# Patient Record
Sex: FEMALE | Race: BLACK OR AFRICAN AMERICAN | NOT HISPANIC OR LATINO | Employment: PART TIME | URBAN - METROPOLITAN AREA
[De-identification: names, ages, dates, MRNs, and addresses within clinical notes are randomized per-mention and may not be internally consistent; named-entity substitution may affect disease eponyms.]

---

## 2017-04-24 PROBLEM — E66.01 MORBID OBESITY WITH BMI OF 40.0-44.9, ADULT: Chronic | Status: ACTIVE | Noted: 2017-04-24

## 2017-04-24 PROBLEM — R13.12 OROPHARYNGEAL DYSPHAGIA: Status: ACTIVE | Noted: 2017-04-24

## 2017-04-25 PROBLEM — R94.31 ABNORMAL RESTING ECG FINDINGS: Status: ACTIVE | Noted: 2017-04-25

## 2017-04-25 PROBLEM — Z01.818 PREOPERATIVE EVALUATION TO RULE OUT SURGICAL CONTRAINDICATION: Status: ACTIVE | Noted: 2017-04-25

## 2017-05-29 PROBLEM — E04.1 THYROID NODULE: Status: ACTIVE | Noted: 2017-05-29

## 2017-06-15 PROBLEM — R94.31 ABNORMAL RESTING ECG FINDINGS: Status: RESOLVED | Noted: 2017-04-25 | Resolved: 2017-06-15

## 2017-06-15 PROBLEM — Z01.818 PREOPERATIVE EVALUATION TO RULE OUT SURGICAL CONTRAINDICATION: Status: RESOLVED | Noted: 2017-04-25 | Resolved: 2017-06-15

## 2017-09-06 PROBLEM — E89.0 POSTSURGICAL HYPOTHYROIDISM: Chronic | Status: ACTIVE | Noted: 2017-09-06

## 2017-10-27 ENCOUNTER — TELEPHONE (OUTPATIENT)
Dept: OPHTHALMOLOGY | Facility: CLINIC | Age: 32
End: 2017-10-27

## 2017-10-27 NOTE — TELEPHONE ENCOUNTER
Left patient a voicemail to assist with scheduling Crosslinking procedure with Dr. Rivers on Dec. 6th.

## 2017-10-27 NOTE — TELEPHONE ENCOUNTER
----- Message from Sade Rivers MD sent at 10/25/2017  2:26 PM CDT -----  Okay, I talked with Dr. Beckett and Jenifer Pabon.    This pt will be funded through the [employer] [facility] her for first eye. Please call and schedule. Let me know if you have any issues.    Thank you.  ----- Message -----  From: Esperanza Partida MA  Sent: 10/23/2017   9:53 AM  To: Sade Rivers MD    Good morning Dr. Rivers,    For qualification with ENT, you are going to have to follow up with Dr. Beckett to see his process to see if the patient has to pay or not. The only thing Ms. Bull do is file the claim / sends invoice to the company.     ----- Message -----  From: Esperanza Partida MA  Sent: 10/23/2017   9:24 AM  To: BRICE Soriano MD Sharonda M. Davenport, MA  Caller: Unspecified (Yesterday,  4:18 PM)         This is a pt referred to me at [facility] in [place] - would benefit from CXL. OS>OD.     May qualify for funding [facility] - can you ask jenifer to contact pt about her finances and see if she would qualify?  I could do left eye Dec 6th AM.     If she does not qualify - pt asked about information for credit care.     I told her she would hear from you next wk - pls call and LMK thanks !

## 2017-10-30 ENCOUNTER — TELEPHONE (OUTPATIENT)
Dept: OPHTHALMOLOGY | Facility: CLINIC | Age: 32
End: 2017-10-30

## 2017-10-30 NOTE — TELEPHONE ENCOUNTER
----- Message from Esperanza Partida MA sent at 10/23/2017  9:24 AM CDT -----  MD Esperanza Hahn MA  Caller: Unspecified (Yesterday,  4:18 PM)         This is a pt referred to me at [facility] in [place] - would benefit from CXL. OS>OD.     May qualify for funding by [facility] - can you ask jenifer to contact pt about her finances and see if she would qualify?  I could do left eye Dec 6th AM.     If she does not qualify - pt asked about information for credit care.     I told her she would hear from you next wk - pls call and LMK thanks !

## 2017-10-30 NOTE — TELEPHONE ENCOUNTER
----- Message from Esperanza Partida MA sent at 10/23/2017  9:24 AM CDT -----  MD Esperanza Hahn MA  Caller: Unspecified (Yesterday,  4:18 PM)         This is a pt referred to me at [facility] in [place] - would benefit from CXL. OS>OD.     May qualify for funding by [facility] - can you ask jenifer to contact pt about her finances and see if she would qualify?  I could do left eye Dec 6th AM.     If she does not qualify - pt asked about information for credit care.     I told her she would hear from you next wk - pls call and LMK thanks !

## 2017-10-30 NOTE — TELEPHONE ENCOUNTER
Called patient and left a voicemail to assist patient with setting up an appointment for Crosslinking procedure. Asked patient to give me a call back at the office.

## 2017-11-03 ENCOUNTER — TELEPHONE (OUTPATIENT)
Dept: OPHTHALMOLOGY | Facility: CLINIC | Age: 32
End: 2017-11-03

## 2017-11-03 NOTE — TELEPHONE ENCOUNTER
----- Message from Esperanza Partida MA sent at 10/23/2017  9:24 AM CDT -----  MD Esperanza Hahn MA  Caller: Unspecified (Yesterday,  4:18 PM)         This is a pt referred to me at [facility] in [place] - would benefit from CXL. OS>OD.     May qualify for funding by [facility] - can you ask jenifer to contact pt about her finances and see if she would qualify?  I could do left eye Dec 6th AM.     If she does not qualify - pt asked about information for credit care.     I told her she would hear from you next wk - pls call and LMK thanks !

## 2017-11-03 NOTE — TELEPHONE ENCOUNTER
Spoke to patient and she wants to do her Crosslinking procedure in January 2018. Informed patient that I will follow-up with the provider and give her a call back once I get the date done.

## 2017-11-06 ENCOUNTER — TELEPHONE (OUTPATIENT)
Dept: OPHTHALMOLOGY | Facility: CLINIC | Age: 32
End: 2017-11-06

## 2017-11-06 NOTE — TELEPHONE ENCOUNTER
----- Message from Esperanza Partida MA sent at 10/23/2017  9:24 AM CDT -----  MD Esperanza Hahn MA  Caller: Unspecified (Yesterday,  4:18 PM)         This is a pt referred to me at [facility] in [place] - would benefit from CXL. OS>OD.     May qualify for funding by [facility] - can you ask jenifer to contact pt about her finances and see if she would qualify?  I could do left eye Dec 6th AM.     If she does not qualify - pt asked about information for credit care.     I told her she would hear from you next wk - pls call and LMK thanks !

## 2017-11-06 NOTE — TELEPHONE ENCOUNTER
Called patient and left voicemail informing patient that we can schedule CXL for Andrzej 3, 2018 AM per Dr. Rivers.

## 2017-11-20 ENCOUNTER — TELEPHONE (OUTPATIENT)
Dept: OPHTHALMOLOGY | Facility: CLINIC | Age: 32
End: 2017-11-20

## 2017-11-20 NOTE — TELEPHONE ENCOUNTER
Called patient left voicemail to schedule Croslinking procedure appointment with Dr. Rivers for Jan 3rd '18. Asked patient to contact me via email.

## 2017-11-24 ENCOUNTER — TELEPHONE (OUTPATIENT)
Dept: OPHTHALMOLOGY | Facility: CLINIC | Age: 32
End: 2017-11-24

## 2017-11-24 RX ORDER — DIAZEPAM 2 MG/1
2 TABLET ORAL ONCE AS NEEDED
Qty: 3 TABLET | Refills: 0 | Status: SHIPPED | OUTPATIENT
Start: 2017-11-24 | End: 2017-11-24 | Stop reason: SDUPTHER

## 2017-11-24 RX ORDER — PREDNISOLONE ACETATE 10 MG/ML
1 SUSPENSION/ DROPS OPHTHALMIC 3 TIMES DAILY
Qty: 5 ML | Refills: 1 | Status: SHIPPED | OUTPATIENT
Start: 2017-11-24 | End: 2017-12-24

## 2017-11-24 RX ORDER — DIAZEPAM 2 MG/1
2 TABLET ORAL ONCE AS NEEDED
Qty: 3 TABLET | Refills: 0 | Status: SHIPPED | OUTPATIENT
Start: 2017-11-24 | End: 2017-12-12 | Stop reason: SDUPTHER

## 2017-11-24 RX ORDER — OFLOXACIN 3 MG/ML
1 SOLUTION/ DROPS OPHTHALMIC 3 TIMES DAILY
Qty: 5 ML | Refills: 1 | Status: SHIPPED | OUTPATIENT
Start: 2017-11-24 | End: 2017-12-24

## 2017-11-24 NOTE — TELEPHONE ENCOUNTER
Called patient and informed her that Dr. Rivers sent over the Rx drops and when to start them. Informed patient of the Rx Valium that was ordered also. Mentioned to patient not to take the valium until she sign the consent form with us on procedure day.

## 2017-11-24 NOTE — TELEPHONE ENCOUNTER
Informed patient procedure gtts was called into pharmacy and for her to start them 2 days before surgery. Also, valium is to be brought to [facility] the day of surgery.

## 2017-11-24 NOTE — TELEPHONE ENCOUNTER
----- Message from Esperanza Partida MA sent at 11/22/2017  4:15 PM CST -----   Patient need Rx drops sent to her pharmacy. Scheduled for Jan 3rd.    ----- Message -----  From: Sade Rivers MD  Sent: 10/25/2017   2:26 PM  To: Esperanza Partida MA    Okay, I talked with Dr. Beckett and Jenifer Pabon.    This pt will be funded through the [facility] her for first eye. Please call and schedule. Let me know if you have any issues.    Thank you.  ----- Message -----  From: Esperanza Partida MA  Sent: 10/23/2017   9:53 AM  To: Sade Rivers MD    Good morning Dr. Rivers,    For qualification with ENT, you are going to have to follow up with Dr. Beckett to see his process to see if the patient has to pay or not. The only thing Ms. Bull do is file the claim / sends invoice to the company.     ----- Message -----  From: Esperanza Partida MA  Sent: 10/23/2017   9:24 AM  To: BRICE Soriano MD Sharonda M. Davenport, MA  Caller: Unspecified (Yesterday,  4:18 PM)         This is a pt referred to me at [facility] in [place] - would benefit from CXL. OS>OD.     May qualify for funding by [facility] - can you ask jenifer to contact pt about her finances and see if she would qualify?  I could do left eye Dec 6th AM.     If she does not qualify - pt asked about information for credit care.     I told her she would hear from you next wk - pls call and LMK thanks !

## 2017-12-12 ENCOUNTER — TELEPHONE (OUTPATIENT)
Dept: OPHTHALMOLOGY | Facility: CLINIC | Age: 32
End: 2017-12-12

## 2017-12-12 RX ORDER — DIAZEPAM 2 MG/1
2 TABLET ORAL ONCE AS NEEDED
Qty: 3 TABLET | Refills: 0 | Status: SHIPPED | OUTPATIENT
Start: 2017-12-12 | End: 2018-02-16

## 2017-12-13 ENCOUNTER — TELEPHONE (OUTPATIENT)
Dept: OPHTHALMOLOGY | Facility: CLINIC | Age: 32
End: 2017-12-13

## 2017-12-13 NOTE — TELEPHONE ENCOUNTER
Called patient left voicemail informing her that Dr. Rivers sent over the Rx Valium to her pharmacy. Only to start on the day of the procedure once arrived. Also the Rx for the pain medication will be sent to the pharmacy the day of the procedure.

## 2018-01-03 ENCOUNTER — CLINICAL SUPPORT (OUTPATIENT)
Dept: OPHTHALMOLOGY | Facility: CLINIC | Age: 33
End: 2018-01-03
Payer: MEDICAID

## 2018-01-03 DIAGNOSIS — H18.603 KERATOCONUS OF BOTH EYES: Primary | ICD-10-CM

## 2018-01-03 PROCEDURE — 66999 UNLISTED PX ANT SEGMENT EYE: CPT | Mod: ,,, | Performed by: OPHTHALMOLOGY

## 2018-01-03 PROCEDURE — 99499 UNLISTED E&M SERVICE: CPT | Mod: S$PBB,,, | Performed by: OPHTHALMOLOGY

## 2018-01-03 PROCEDURE — 99212 OFFICE O/P EST SF 10 MIN: CPT | Mod: PBBFAC

## 2018-01-03 PROCEDURE — 99999 PR PBB SHADOW E&M-EST. PATIENT-LVL II: CPT | Mod: PBBFAC,,,

## 2018-01-03 RX ORDER — OXYCODONE AND ACETAMINOPHEN 7.5; 325 MG/1; MG/1
1 TABLET ORAL EVERY 4 HOURS PRN
Qty: 10 TABLET | Refills: 0 | Status: SHIPPED | OUTPATIENT
Start: 2018-01-03 | End: 2018-02-16

## 2018-01-03 NOTE — PROGRESS NOTES
HPI     Referral: Dr. Jenkins in [place]     KCN OU    Pt here for CXL OS    Last edited by Sade Rivers MD on 1/3/2018 10:59 AM. (History)            Assessment /Plan     For exam results, see Encounter Report.    Keratoconus of both eyes    Other orders  -     oxyCODONE-acetaminophen (PERCOCET) 7.5-325 mg per tablet; Take 1 tablet by mouth every 4 (four) hours as needed for Pain.  Dispense: 10 tablet; Refill: 0    SURGEON: Sade Rivers MD     PREOPERATIVE DIAGNOSIS: Keratoconus     POSTOPERATIVE DIAGNOSIS: keratoconus     PROCEDURES: Collagen Crosslinking OS    ANESTHESIA: Topical Tetracaine 0.5%     COMPLICATIONS: None     ESTIMATED BLOOD LOSS: None     SPECIMENS: None     INDICATIONS:  The patient has a history a progressive corneal ectasia. During the initial consultation, a thorough discussion regarding of the risks, benefits, and alternatives to this procedure was undertaken. Risks were listed on the consent form and were reviewed with emphasis on the remote possibility of infection, inflammation, scarring, and progression of ectasia - all of which can potentially lead to loss of vision. Common side effects from the procedure, including pain, dry eye, glare, and haloes, were also explained, as well as defining realistic expectations of stabilizing the disease but not decreasing the need for glasses or contact lenses. The patient voices understanding of these risks and side effects and communicates realistic expectations from the procedure.      DESCRIPTION OF PROCEDURE:  The patient was admitted to the [facility] at [facility] where the operative eye and procedure were verified, vital signs were taken, and pre-operatively 5-10mg of oral diazepam and drops of Zymar and Pred Forte were administered. The patient was brought into the LASER suite and positioned on the bed. A central 9mm epithelial debridement was achieved with the Amls epithelial scrubber, and the initial riboflavin drops  administered. A 30 minute induction with drops every 2 minutes was followed by pachymetry. When corneal pachy is less than 400um, hypotonic riboflavin drops are used to thicken the cornea to a minimum of 400um. Next, a 30 min UV light treatment, centered on the cornea was delivered. Antibiotics and a bandage contact lens were placed.  The patient was discharged with care instructions and a follow up appointment in the eye clinic.     PAIN MEDICATIONS AS NEEDED, OKAY TO TAKE TYLENOL OR ADVIL AS WELL.    PRED FORTE - SHAKE WELL - 4 TIMES A DAY    OCUFLOX - 4 TIMES A DAY    F/UP WITH ME: Friday JAN 12TH, 230PM - AT MAIN CAMPUS ON [facility], 10TH FLOOR

## 2018-01-03 NOTE — Clinical Note
Please send to [facility] for reimbursement for crosslinking ($2000). Pt referred to [facility] from  Dr. Jenkins in [place].  Thank you. Let me know if you have any questions.

## 2018-01-03 NOTE — PATIENT INSTRUCTIONS
PAIN MEDICATIONS AS NEEDED, OKAY TO TAKE TYLENOL OR ADVIL AS WELL.    PRED FORTE - SHAKE WELL - 4 TIMES A DAY    OCUFLOX - 4 TIMES A DAY    F/UP WITH ME: Friday JAN 12TH, 230PM - AT MAIN CAMPUS ON [facility], 10TH FLOOR

## 2018-01-12 ENCOUNTER — OFFICE VISIT (OUTPATIENT)
Dept: OPHTHALMOLOGY | Facility: CLINIC | Age: 33
End: 2018-01-12

## 2018-01-12 DIAGNOSIS — H18.603 KERATOCONUS OF BOTH EYES: Primary | ICD-10-CM

## 2018-01-12 PROCEDURE — 99024 POSTOP FOLLOW-UP VISIT: CPT | Mod: ,,, | Performed by: OPHTHALMOLOGY

## 2018-01-12 PROCEDURE — 99212 OFFICE O/P EST SF 10 MIN: CPT | Mod: PBBFAC | Performed by: OPHTHALMOLOGY

## 2018-01-12 PROCEDURE — 99999 PR PBB SHADOW E&M-EST. PATIENT-LVL II: CPT | Mod: PBBFAC,,, | Performed by: OPHTHALMOLOGY

## 2018-01-12 RX ORDER — PREDNISOLONE ACETATE 10 MG/ML
1 SUSPENSION/ DROPS OPHTHALMIC 3 TIMES DAILY
Qty: 10 ML | Refills: 3 | Status: SHIPPED | OUTPATIENT
Start: 2018-01-12 | End: 2018-07-09

## 2018-01-12 RX ORDER — PREDNISOLONE ACETATE 10 MG/ML
1 SUSPENSION/ DROPS OPHTHALMIC 4 TIMES DAILY
COMMUNITY
End: 2018-01-12 | Stop reason: SDUPTHER

## 2018-01-12 RX ORDER — OFLOXACIN 3 MG/ML
1 SOLUTION/ DROPS OPHTHALMIC 4 TIMES DAILY
COMMUNITY
End: 2018-02-16

## 2018-01-12 NOTE — PROGRESS NOTES
HPI     Referral: Dr. Jenkins in [place]     KCN OU  S/p CXL OS 1/3/18    PF QID OS  Ocuflox QID OS    Pt here for post op check OS.  Pt states OS is still blurry.  Pt states   BCL fell out of OS. Pt denies pain OS.    Last edited by Sade Rivers MD on 1/12/2018  1:23 PM. (History)            Assessment /Plan     For exam results, see Encounter Report.    Keratoconus of both eyes    Other orders  -     prednisoLONE acetate (PRED FORTE) 1 % DrpS; Place 1 drop into the left eye 3 (three) times daily.  Dispense: 10 mL; Refill: 3        S/p CXL OS 1/3/18  - doing well, BCL fell out already  - PF TID, can stop abx    F/up 1 mo - arx / mrx OU - pls read current Rx as well. [facility] IOP OS

## 2018-01-12 NOTE — LETTER
[facility]  [address]  [facility] [address]  Phone: [phone]  Fax: [phone]   January 12, 2018    No Recipients    Patient: Yuri Mijares   MR Number: [id number]   YOB: 1985   Date of Visit: 1/12/2018     To whom it may concern,    Ms. Mijares just had an eye procedure and is in the healing process. She is having trouble seeing in the dark, especially driving.  I expect this to improve over the course of the next few weeks.     If you have questions, please do not hesitate to call me.      Sincerely,        Sade Rivers MD       CC  No Recipients             Base Eye Exam     Visual Acuity (Snellen - Linear)       Right Left    Dist sc  20/50-2    Dist cc  20/100-1          Neuro/Psych     Oriented x3:  Yes    Mood/Affect:  Normal

## 2018-02-16 ENCOUNTER — OFFICE VISIT (OUTPATIENT)
Dept: OPHTHALMOLOGY | Facility: CLINIC | Age: 33
End: 2018-02-16

## 2018-02-16 DIAGNOSIS — H18.603 KERATOCONUS OF BOTH EYES: Primary | ICD-10-CM

## 2018-02-16 PROCEDURE — 99999 PR PBB SHADOW E&M-EST. PATIENT-LVL III: CPT | Mod: PBBFAC,,, | Performed by: OPHTHALMOLOGY

## 2018-02-16 PROCEDURE — 99024 POSTOP FOLLOW-UP VISIT: CPT | Mod: ,,, | Performed by: OPHTHALMOLOGY

## 2018-02-16 PROCEDURE — 99213 OFFICE O/P EST LOW 20 MIN: CPT | Mod: PBBFAC | Performed by: OPHTHALMOLOGY

## 2018-02-16 NOTE — PROGRESS NOTES
HPI     Eye Problem    Additional comments: KCN OU s/p CXL OS           Comments   Referral: Dr. Jenkins in [place]     KCN OU  S/p CXL OS 1/3/18    PF TID OS    Pt here for post op check OS.  Pt states OS vision still blurred.  No   pain.  Has been having migraines.  Using drops as directed.        Last edited by Sade Rivers MD on 2/16/2018 11:45 AM. (History)            Assessment /Plan     For exam results, see Encounter Report.    Keratoconus of both eyes        S/p CXL OS 1/3/18  - healing well, still with haze.       F/up 3 mo - arx / mrx OU -   VA IOP OS      PRED FORTE - SHAKE WELL -    TWICE A DAY FOR 1 MONTH THEN DAILY FOR ONE MONTH THEN STOP

## 2018-06-19 ENCOUNTER — TELEPHONE (OUTPATIENT)
Dept: OPHTHALMOLOGY | Facility: CLINIC | Age: 33
End: 2018-06-19

## 2018-06-19 NOTE — TELEPHONE ENCOUNTER
----- Message from Darío Ruiz MA sent at 6/18/2018  4:40 PM CDT -----  Contact: Andressa      ----- Message -----  From: Fay Sánchez  Sent: 6/18/2018   3:36 PM  To: Silvestre MURPHY Staff    Pt calling to reschedule for crosslinking procedure that she missed in January.  She can be reached at [phone]

## 2018-06-20 ENCOUNTER — TELEPHONE (OUTPATIENT)
Dept: OPHTHALMOLOGY | Facility: CLINIC | Age: 33
End: 2018-06-20

## 2018-06-20 NOTE — TELEPHONE ENCOUNTER
----- Message from Sade Rivers MD sent at 6/19/2018  5:13 PM CDT -----  Contact: Andressa   Since it's been so long since i've seen her - will need to do repeat montse and can place order / schedule cxl after that appt. pls book f/up with me.  ----- Message -----  From: Darío Ruiz MA  Sent: 6/18/2018   4:40 PM  To: Sade Rivers MD, #        ----- Message -----  From: Fay Gonzalo  Sent: 6/18/2018   3:36 PM  To: Silvestre MURPHY Staff    Pt calling to reschedule for crosslinking procedure that she missed in January.  She can be reached at [phone]

## 2018-06-25 ENCOUNTER — TELEPHONE (OUTPATIENT)
Dept: OPHTHALMOLOGY | Facility: CLINIC | Age: 33
End: 2018-06-25

## 2018-06-25 NOTE — TELEPHONE ENCOUNTER
----- Message from Sade Rivers MD sent at 6/19/2018  5:11 PM CDT -----  Contact: Andressa aquino - see below.    I will place another cxl order.    ----- Message -----  From: Darío Ruiz MA  Sent: 6/18/2018   4:40 PM  To: Sade Rivers MD, #        ----- Message -----  From: Fay Sánchez  Sent: 6/18/2018   3:36 PM  To: Silvestre MURPHY Staff    Pt calling to reschedule for crosslinking procedure that she missed in January.  She can be reached at [phone]

## 2018-07-09 ENCOUNTER — OFFICE VISIT (OUTPATIENT)
Dept: OPHTHALMOLOGY | Facility: CLINIC | Age: 33
End: 2018-07-09

## 2018-07-09 DIAGNOSIS — H18.603 KERATOCONUS OF BOTH EYES: Primary | ICD-10-CM

## 2018-07-09 PROCEDURE — 99499 UNLISTED E&M SERVICE: CPT | Mod: S$PBB,,, | Performed by: OPHTHALMOLOGY

## 2018-07-09 PROCEDURE — 99999 PR PBB SHADOW E&M-EST. PATIENT-LVL II: CPT | Mod: PBBFAC,,, | Performed by: OPHTHALMOLOGY

## 2018-07-09 PROCEDURE — 99212 OFFICE O/P EST SF 10 MIN: CPT | Mod: PBBFAC | Performed by: OPHTHALMOLOGY

## 2018-07-09 PROCEDURE — 92025 CPTRIZED CORNEAL TOPOGRAPHY: CPT | Mod: PBBFAC | Performed by: OPHTHALMOLOGY

## 2018-07-09 NOTE — PROGRESS NOTES
HPI     Referral: Dr. Jenkins in [place]     KCN OU  S/p CXL OS 1/3/18    Pt here for 3 month check OS.  Pt is not interested in doing CXL OD.  Pt   states VA OS is blurry.  Pt is interested in a new Rx foe eyeglasses   because her glasses are old.     Last edited by Neelima Awad MA on 7/9/2018 10:47 AM. (History)              Assessment /Plan     For exam results, see Encounter Report.    Keratoconus of both eyes  -     Computerized corneal topography        S/p CXL OS 1/3/18  - healed    KCN OU  - pt prefers to hold off on CXL OD for now, will let us know when ready to schedule  - no rubbing eyes    RE  - pt requests updated glasses Rx, eval with dr. Hess.    - explained to pt that CL will be most beneficial for BCVA - pt does not like anything touching her eye    Pt requested this appt be made PO despite after 3 mo window.

## 2018-08-14 ENCOUNTER — TELEPHONE (OUTPATIENT)
Dept: OPHTHALMOLOGY | Facility: CLINIC | Age: 33
End: 2018-08-14

## 2018-08-14 NOTE — TELEPHONE ENCOUNTER
Spoke to pt and she is not interested in CXL at this time.  Pt states she was scheduled to see Dr Hess for MRX but we don't take her insurance. Pt is wondering who Dr Rivers recommends her to see. Advised pt that Dr Rivers is out of the office and once I hear back from her regarding a recommendation I will call pt back.  Pt understood.

## 2018-08-15 ENCOUNTER — TELEPHONE (OUTPATIENT)
Dept: OPHTHALMOLOGY | Facility: CLINIC | Age: 33
End: 2018-08-15

## 2018-08-15 NOTE — TELEPHONE ENCOUNTER
Spoke to pt and advised that Dr Rivers talked to the staff at [facility] and they advised that she call the number on the back of her insurance card and ask who she can go to for refraction b/c there are many different medicaids and it is different for each one. Pt understood.

## 2018-08-15 NOTE — TELEPHONE ENCOUNTER
----- Message from Neelima Awad MA sent at 8/15/2018 10:30 AM CDT -----  MD Neelima Hahn MA         Okay I talked to the [facility] people.  They said she has to call the number on the back of her insurance card and ask who she can go to for refraction b/c there are many different medicaids and it is different for each one. Hope that helps!   Previous Messages        ----- Message -----   From: Neelima Awad MA   Sent: 8/14/2018   4:08 PM   To: Sade Rivers MD     Pt is not interested in CXL at this time.  Pt states she was scheduled to see Dr Hess for MRX but we don't take her insurance. Pt is wondering who you recommend her to see. Pt spoke to Mackenzie, the telephone , and she gave her the number to [facility].  Is that ok or would you like to send her to someone else?  Please advise.     Neelima

## 2019-06-17 PROBLEM — R73.02 IGT (IMPAIRED GLUCOSE TOLERANCE): Status: ACTIVE | Noted: 2019-06-17

## 2022-04-07 ENCOUNTER — HISTORICAL (OUTPATIENT)
Dept: ADMINISTRATIVE | Facility: HOSPITAL | Age: 37
End: 2022-04-07
Payer: MEDICAID

## 2022-04-23 VITALS
OXYGEN SATURATION: 96 % | BODY MASS INDEX: 44.87 KG/M2 | SYSTOLIC BLOOD PRESSURE: 114 MMHG | DIASTOLIC BLOOD PRESSURE: 80 MMHG | WEIGHT: 237.63 LBS | HEIGHT: 61 IN

## 2022-05-04 ENCOUNTER — HOSPITAL ENCOUNTER (EMERGENCY)
Facility: HOSPITAL | Age: 37
Discharge: LEFT BEFORE DISCHARGE | End: 2022-05-05
Payer: MEDICAID

## 2022-05-04 VITALS
TEMPERATURE: 99 F | HEIGHT: 63 IN | WEIGHT: 231.5 LBS | HEART RATE: 107 BPM | BODY MASS INDEX: 41.02 KG/M2 | SYSTOLIC BLOOD PRESSURE: 97 MMHG | RESPIRATION RATE: 18 BRPM | DIASTOLIC BLOOD PRESSURE: 72 MMHG | OXYGEN SATURATION: 97 %

## 2022-05-04 PROCEDURE — 99900041 HC LEFT WITHOUT BEING SEEN- EMERGENCY

## 2022-05-04 PROCEDURE — 87428 SARSCOV & INF VIR A&B AG IA: CPT | Performed by: PHYSICIAN ASSISTANT

## 2022-05-04 RX ORDER — ONDANSETRON 4 MG/1
4 TABLET, ORALLY DISINTEGRATING ORAL
Status: DISCONTINUED | OUTPATIENT
Start: 2022-05-04 | End: 2022-05-05 | Stop reason: HOSPADM

## 2022-05-05 LAB
FLUAV AG UPPER RESP QL IA.RAPID: NOT DETECTED
FLUBV AG UPPER RESP QL IA.RAPID: NOT DETECTED
SARS-COV-2 RNA RESP QL NAA+PROBE: NOT DETECTED

## 2022-05-05 PROCEDURE — 87636 SARSCOV2 & INF A&B AMP PRB: CPT | Performed by: PHYSICIAN ASSISTANT

## 2022-05-05 NOTE — FIRST PROVIDER EVALUATION
Medical screening exam completed.  I have conducted a focused provider triage encounter, findings are as follows:    Brief history of present illness:  36yoAAF with vomiting, diarrhea x 1 day. Denies n/v/f/c/cp or sob. Says virus is going around . S NIKOLAI Veloz (triage)    There were no vitals filed for this visit.    Pertinent physical exam:  NAD      Brief workup plan:  Flu/covid, UA    Preliminary workup initiated; this workup will be continued and followed by the physician or advanced practice provider that is assigned to the patient when roomed.

## 2022-06-14 ENCOUNTER — OFFICE VISIT (OUTPATIENT)
Dept: OPHTHALMOLOGY | Facility: CLINIC | Age: 37
End: 2022-06-14
Attending: OPHTHALMOLOGY
Payer: MEDICAID

## 2022-06-14 DIAGNOSIS — H18.623 KERATOCONUS, UNSTABLE, BILATERAL: Primary | ICD-10-CM

## 2022-06-14 PROCEDURE — 1159F MED LIST DOCD IN RCRD: CPT | Mod: CPTII,,, | Performed by: OPHTHALMOLOGY

## 2022-06-14 PROCEDURE — 99999 PR PBB SHADOW E&M-EST. PATIENT-LVL II: ICD-10-PCS | Mod: PBBFAC,,, | Performed by: OPHTHALMOLOGY

## 2022-06-14 PROCEDURE — 99203 OFFICE O/P NEW LOW 30 MIN: CPT | Mod: S$PBB,,, | Performed by: OPHTHALMOLOGY

## 2022-06-14 PROCEDURE — 1160F RVW MEDS BY RX/DR IN RCRD: CPT | Mod: CPTII,,, | Performed by: OPHTHALMOLOGY

## 2022-06-14 PROCEDURE — 1160F PR REVIEW ALL MEDS BY PRESCRIBER/CLIN PHARMACIST DOCUMENTED: ICD-10-PCS | Mod: CPTII,,, | Performed by: OPHTHALMOLOGY

## 2022-06-14 PROCEDURE — 99203 PR OFFICE/OUTPT VISIT, NEW, LEVL III, 30-44 MIN: ICD-10-PCS | Mod: S$PBB,,, | Performed by: OPHTHALMOLOGY

## 2022-06-14 PROCEDURE — 99999 PR PBB SHADOW E&M-EST. PATIENT-LVL II: CPT | Mod: PBBFAC,,, | Performed by: OPHTHALMOLOGY

## 2022-06-14 PROCEDURE — 99212 OFFICE O/P EST SF 10 MIN: CPT | Mod: PBBFAC | Performed by: OPHTHALMOLOGY

## 2022-06-14 PROCEDURE — 1159F PR MEDICATION LIST DOCUMENTED IN MEDICAL RECORD: ICD-10-PCS | Mod: CPTII,,, | Performed by: OPHTHALMOLOGY

## 2022-06-14 RX ORDER — MOXIFLOXACIN 5 MG/ML
1 SOLUTION/ DROPS OPHTHALMIC
Status: CANCELLED | OUTPATIENT
Start: 2022-06-14

## 2022-06-14 RX ORDER — TETRACAINE HYDROCHLORIDE 5 MG/ML
1 SOLUTION OPHTHALMIC
Status: CANCELLED | OUTPATIENT
Start: 2022-06-14

## 2022-06-14 RX ORDER — SODIUM CHLORIDE 0.9 % (FLUSH) 0.9 %
10 SYRINGE (ML) INJECTION
Status: SHIPPED | OUTPATIENT
Start: 2022-06-14

## 2022-06-14 NOTE — PROGRESS NOTES
HPI     37 y/o is here for K evaluation.  She has a history of KXL OS.  Eyes do   tear and sometimes they are sticky.    No drops OU    Last edited by Bell Davila on 6/14/2022  3:55 PM. (History)            Assessment /Plan     For exam results, see Encounter Report.    Keratoconus, unstable, bilateral      The diagnosis of corneal ectasia and its etiology and clinical course were discussed.  Treatment with the use of specialty contact lenses, collagen cross linking, and corneal transplantation was explained in detail.    OS stable but OD has progressed into severe stages of KCN, requiring PKP  Very thin and ectatic...    Plan PKP OS, with retrobulbar block (local anesthesia)

## 2022-07-20 ENCOUNTER — TELEPHONE (OUTPATIENT)
Dept: OPHTHALMOLOGY | Facility: CLINIC | Age: 37
End: 2022-07-20
Payer: MEDICAID

## 2022-07-21 ENCOUNTER — TELEPHONE (OUTPATIENT)
Dept: OPHTHALMOLOGY | Facility: CLINIC | Age: 37
End: 2022-07-21
Payer: MEDICAID

## 2022-07-21 DIAGNOSIS — H18.623 KERATOCONUS, UNSTABLE, BILATERAL: Primary | ICD-10-CM

## 2022-09-15 ENCOUNTER — TELEPHONE (OUTPATIENT)
Dept: OPHTHALMOLOGY | Facility: CLINIC | Age: 37
End: 2022-09-15
Payer: MEDICAID

## 2022-09-15 NOTE — TELEPHONE ENCOUNTER
Patient given arrival time of 11:30 am on Thursday September 22 . Nothing to eat or drink after 9 pm.  Water, Gatorade after 9 pm until leaves home.  Start drops into the operative eye today. [address]

## 2022-09-20 ENCOUNTER — TELEPHONE (OUTPATIENT)
Dept: OPHTHALMOLOGY | Facility: CLINIC | Age: 37
End: 2022-09-20
Payer: MEDICAID

## 2022-09-20 NOTE — TELEPHONE ENCOUNTER
----- Message from Sharri Umanzor sent at 9/20/2022  2:44 PM CDT -----  Regarding: Speak with office  Contact: Yuri Mitchell is calling to speak with office pertaining to upcoming procedure.    [phone]

## 2022-09-21 RX ORDER — PIOGLITAZONEHYDROCHLORIDE 30 MG/1
30 TABLET ORAL DAILY
Status: ON HOLD | COMMUNITY
Start: 2022-06-24 | End: 2023-07-06 | Stop reason: HOSPADM

## 2022-09-21 RX ORDER — SITAGLIPTIN 100 MG/1
100 TABLET, FILM COATED ORAL DAILY
Status: ON HOLD | COMMUNITY
Start: 2022-06-24 | End: 2023-07-06 | Stop reason: HOSPADM

## 2022-09-21 RX ORDER — SUMATRIPTAN SUCCINATE 100 MG/1
100 TABLET ORAL 2 TIMES DAILY
COMMUNITY
Start: 2022-04-21 | End: 2024-02-06

## 2022-09-21 RX ORDER — VENLAFAXINE HCL 150 MG
150 CAPSULE, EXT RELEASE 24 HR ORAL DAILY
COMMUNITY
Start: 2022-08-08

## 2022-09-21 RX ORDER — OFLOXACIN 3 MG/ML
SOLUTION/ DROPS OPHTHALMIC
COMMUNITY
Start: 2022-09-15 | End: 2024-02-06

## 2022-09-21 RX ORDER — LEVOTHYROXINE SODIUM 200 UG/1
200 TABLET ORAL EVERY MORNING
COMMUNITY
Start: 2022-06-24

## 2022-09-21 RX ORDER — PREDNISOLONE ACETATE 10 MG/ML
SUSPENSION/ DROPS OPHTHALMIC
COMMUNITY
Start: 2022-09-15 | End: 2024-02-06

## 2022-09-21 RX ORDER — PROMETHAZINE HYDROCHLORIDE 25 MG/1
12.5 TABLET ORAL EVERY 6 HOURS PRN
COMMUNITY
Start: 2022-04-21 | End: 2024-02-06

## 2022-09-21 RX ORDER — BUTALBITAL, ACETAMINOPHEN AND CAFFEINE 300; 40; 50 MG/1; MG/1; MG/1
1 CAPSULE ORAL EVERY 4 HOURS PRN
COMMUNITY
Start: 2022-04-21 | End: 2024-02-06

## 2022-09-22 ENCOUNTER — HOSPITAL ENCOUNTER (OUTPATIENT)
Facility: OTHER | Age: 37
Discharge: HOME | End: 2022-09-22
Attending: OPHTHALMOLOGY | Admitting: OPHTHALMOLOGY
Payer: MEDICAID

## 2022-09-22 ENCOUNTER — ANESTHESIA EVENT (OUTPATIENT)
Dept: SURGERY | Facility: OTHER | Age: 37
End: 2022-09-22
Payer: MEDICAID

## 2022-09-22 ENCOUNTER — ANESTHESIA (OUTPATIENT)
Dept: SURGERY | Facility: OTHER | Age: 37
End: 2022-09-22
Payer: MEDICAID

## 2022-09-22 VITALS
SYSTOLIC BLOOD PRESSURE: 133 MMHG | HEIGHT: 62 IN | OXYGEN SATURATION: 98 % | WEIGHT: 275 LBS | DIASTOLIC BLOOD PRESSURE: 79 MMHG | BODY MASS INDEX: 50.61 KG/M2 | RESPIRATION RATE: 18 BRPM | HEART RATE: 87 BPM | TEMPERATURE: 99 F

## 2022-09-22 DIAGNOSIS — H18.623 KERATOCONUS, UNSTABLE, BILATERAL: ICD-10-CM

## 2022-09-22 DIAGNOSIS — H18.609 KERATOCONUS, UNSPECIFIED LATERALITY: Primary | ICD-10-CM

## 2022-09-22 LAB
B-HCG UR QL: NEGATIVE
CTP QC/QA: YES
CTP QC/QA: YES
SARS-COV-2 AG RESP QL IA.RAPID: NEGATIVE

## 2022-09-22 PROCEDURE — 88304 PR  SURG PATH,LEVEL III: ICD-10-PCS | Mod: 26,,, | Performed by: STUDENT IN AN ORGANIZED HEALTH CARE EDUCATION/TRAINING PROGRAM

## 2022-09-22 PROCEDURE — 88313 SPECIAL STAINS GROUP 2: CPT | Performed by: STUDENT IN AN ORGANIZED HEALTH CARE EDUCATION/TRAINING PROGRAM

## 2022-09-22 PROCEDURE — 63600175 PHARM REV CODE 636 W HCPCS: Performed by: NURSE ANESTHETIST, CERTIFIED REGISTERED

## 2022-09-22 PROCEDURE — 36000707: Performed by: OPHTHALMOLOGY

## 2022-09-22 PROCEDURE — 63600175 PHARM REV CODE 636 W HCPCS: Performed by: OPHTHALMOLOGY

## 2022-09-22 PROCEDURE — 00144 ANES PX EYE CORNEAL TRNSPL: CPT | Performed by: OPHTHALMOLOGY

## 2022-09-22 PROCEDURE — 25000003 PHARM REV CODE 250: Performed by: OPHTHALMOLOGY

## 2022-09-22 PROCEDURE — V2785 CORNEAL TISSUE PROCESSING: HCPCS | Performed by: OPHTHALMOLOGY

## 2022-09-22 PROCEDURE — 71000015 HC POSTOP RECOV 1ST HR: Performed by: OPHTHALMOLOGY

## 2022-09-22 PROCEDURE — 88304 TISSUE EXAM BY PATHOLOGIST: CPT | Performed by: STUDENT IN AN ORGANIZED HEALTH CARE EDUCATION/TRAINING PROGRAM

## 2022-09-22 PROCEDURE — 37000008 HC ANESTHESIA 1ST 15 MINUTES: Performed by: OPHTHALMOLOGY

## 2022-09-22 PROCEDURE — 65730 CORNEAL TRANSPLANT: CPT | Mod: RT,,, | Performed by: OPHTHALMOLOGY

## 2022-09-22 PROCEDURE — 88304 TISSUE EXAM BY PATHOLOGIST: CPT | Mod: 26,,, | Performed by: STUDENT IN AN ORGANIZED HEALTH CARE EDUCATION/TRAINING PROGRAM

## 2022-09-22 PROCEDURE — 37000009 HC ANESTHESIA EA ADD 15 MINS: Performed by: OPHTHALMOLOGY

## 2022-09-22 PROCEDURE — 27201423 OPTIME MED/SURG SUP & DEVICES STERILE SUPPLY: Performed by: OPHTHALMOLOGY

## 2022-09-22 PROCEDURE — 88313 SPECIAL STAINS GROUP 2: CPT | Mod: 26,,, | Performed by: STUDENT IN AN ORGANIZED HEALTH CARE EDUCATION/TRAINING PROGRAM

## 2022-09-22 PROCEDURE — 81025 URINE PREGNANCY TEST: CPT | Performed by: ANESTHESIOLOGY

## 2022-09-22 PROCEDURE — 88313 PR  SPECIAL STAINS,GROUP II: ICD-10-PCS | Mod: 26,,, | Performed by: STUDENT IN AN ORGANIZED HEALTH CARE EDUCATION/TRAINING PROGRAM

## 2022-09-22 PROCEDURE — 36000706: Performed by: OPHTHALMOLOGY

## 2022-09-22 PROCEDURE — 65730 PR CORNEAL TRANSPLANT,PENETRATING: ICD-10-PCS | Mod: RT,,, | Performed by: OPHTHALMOLOGY

## 2022-09-22 DEVICE — CORNEA TRANSPLANTABLE PRE CUT: Type: IMPLANTABLE DEVICE | Site: EYE | Status: FUNCTIONAL

## 2022-09-22 RX ORDER — GENTAMICIN SULFATE 40 MG/ML
INJECTION, SOLUTION INTRAMUSCULAR; INTRAVENOUS
Status: DISCONTINUED | OUTPATIENT
Start: 2022-09-22 | End: 2022-09-22 | Stop reason: HOSPADM

## 2022-09-22 RX ORDER — FENTANYL CITRATE 50 UG/ML
INJECTION, SOLUTION INTRAMUSCULAR; INTRAVENOUS
Status: DISCONTINUED | OUTPATIENT
Start: 2022-09-22 | End: 2022-09-22

## 2022-09-22 RX ORDER — HYDROCODONE BITARTRATE AND ACETAMINOPHEN 5; 325 MG/1; MG/1
1 TABLET ORAL EVERY 4 HOURS PRN
Status: DISCONTINUED | OUTPATIENT
Start: 2022-09-22 | End: 2022-09-22 | Stop reason: HOSPADM

## 2022-09-22 RX ORDER — ACETAMINOPHEN 325 MG/1
650 TABLET ORAL EVERY 4 HOURS PRN
Status: DISCONTINUED | OUTPATIENT
Start: 2022-09-22 | End: 2022-09-22 | Stop reason: HOSPADM

## 2022-09-22 RX ORDER — BUPIVACAINE HYDROCHLORIDE 7.5 MG/ML
INJECTION, SOLUTION EPIDURAL; RETROBULBAR
Status: DISCONTINUED | OUTPATIENT
Start: 2022-09-22 | End: 2022-09-22 | Stop reason: HOSPADM

## 2022-09-22 RX ORDER — LIDOCAINE HYDROCHLORIDE 20 MG/ML
INJECTION, SOLUTION INFILTRATION; PERINEURAL
Status: DISCONTINUED | OUTPATIENT
Start: 2022-09-22 | End: 2022-09-22 | Stop reason: HOSPADM

## 2022-09-22 RX ORDER — MOXIFLOXACIN 5 MG/ML
1 SOLUTION/ DROPS OPHTHALMIC
Status: COMPLETED | OUTPATIENT
Start: 2022-09-22 | End: 2022-09-22

## 2022-09-22 RX ORDER — MIDAZOLAM HYDROCHLORIDE 1 MG/ML
INJECTION INTRAMUSCULAR; INTRAVENOUS
Status: DISCONTINUED | OUTPATIENT
Start: 2022-09-22 | End: 2022-09-22

## 2022-09-22 RX ORDER — TETRACAINE HYDROCHLORIDE 5 MG/ML
1 SOLUTION OPHTHALMIC
Status: COMPLETED | OUTPATIENT
Start: 2022-09-22 | End: 2022-09-22

## 2022-09-22 RX ADMIN — MIDAZOLAM HYDROCHLORIDE 2 MG: 1 INJECTION, SOLUTION INTRAMUSCULAR; INTRAVENOUS at 12:09

## 2022-09-22 RX ADMIN — TETRACAINE HYDROCHLORIDE 1 DROP: 5 SOLUTION OPHTHALMIC at 11:09

## 2022-09-22 RX ADMIN — MOXIFLOXACIN 1 DROP: 5 SOLUTION/ DROPS OPHTHALMIC at 11:09

## 2022-09-22 RX ADMIN — MIDAZOLAM HYDROCHLORIDE 1 MG: 1 INJECTION, SOLUTION INTRAMUSCULAR; INTRAVENOUS at 12:09

## 2022-09-22 RX ADMIN — FENTANYL CITRATE 50 MCG: 50 INJECTION, SOLUTION INTRAMUSCULAR; INTRAVENOUS at 12:09

## 2022-09-22 NOTE — ANESTHESIA POSTPROCEDURE EVALUATION
Anesthesia Post Evaluation    Patient: Yuri Mijares    Procedure(s) Performed: Procedure(s) (LRB):  KERATOPLASTY, PENETRATING (Right)    Final Anesthesia Type: MAC      Patient location during evaluation: [facility]  Patient participation: Yes- Able to Participate  Level of consciousness: awake and alert  Post-procedure vital signs: reviewed and stable  Pain management: adequate  Airway patency: patent    PONV status at discharge: No PONV  Anesthetic complications: no      Cardiovascular status: blood pressure returned to baseline  Respiratory status: unassisted, room air and spontaneous ventilation  Hydration status: euvolemic  Follow-up not needed.          Vitals Value Taken Time   /82 09/22/22 1205   Temp 37 °C (98.6 °F) 09/22/22 1154   Pulse 99 09/22/22 1154   Resp 18 09/22/22 1154   SpO2 97 % 09/22/22 1154         No case tracking events are documented in the log.      Pain/Jamia Score: No data recorded

## 2022-09-22 NOTE — PLAN OF CARE
Lorenzontneris Mijares has met all discharge criteria from Phase II. Vital Signs are stable, ambulating  without difficulty. Discharge instructions given, patient verbalized understanding. Discharged from facility via wheelchair in stable condition.

## 2022-09-22 NOTE — OP NOTE
SURGEON:  Cristal Beckett M.D.    PREOPERATIVE DIAGNOSIS:    1) Keratoconus  OD  2) Phakic OD    POSTOPERATIVE DIAGNOSIS:     1) Keratoconus  OD  2) Phakic OD    PROCEDURE:    Penetrating keratoplasty, right eye  2.     ANESTHESIA:  RBB      DATE OF SURGERY:  09/22/2022      GRAFT SIZE:  8.5 mm donor button into a   8.25 mm host trephination      COMPLICATIONS:  None.    BLOOD LOSS: Less than 5 cc    SPECIMENS:   1) Cornea     INDICATIONS FOR PROCEDURE :       After a thorough discussion of risks, benefits and alternatives, including, but not limited to, infection, severe hemorrhage, graft failure, need for repeat transplantation, loss of vision, and loss of the eye,  the patient voices understanding and desires to proceed with corneal transplantation.    PROCEDURE IN DETAIL:    The patient was brought to the operating room in supine position where anesthesia was achieved without complication.  Next, the eye was prepped and draped in standard sterile fashion with 5% Betadine and a lid speculum placed in the eye.  The procedure was begun by marking the center of the cornea and sizers  used to determine the necessary size of the grafts.    Attention was then directed to the side table where a donor punch was used to cut the donor tissue.  Attention was then redirected to the patient's eye where a  trephine and an Ritchie PKP gab blade were used to excise the patient's cornea.  The donor button was then sewn into place with 10-0 nylon using 8 interrupted sutures and a 16 x running.  All remaining viscoelastics were removed from the anterior chamber.  The eye was reformed with BSS and wound integrity verified.  Subconjunctival injections of antibiotic and steroid were administered and a patch placed over the eye. The patient will follow up in the eye clinic tomorrow with Dr. Beckett.

## 2022-09-22 NOTE — ANESTHESIA PREPROCEDURE EVALUATION
09/22/2022  Yuri Mijares is a 37 y.o., female.      Pre-op Assessment    I have reviewed the Patient Summary Reports.     I have reviewed the Nursing Notes.    I have reviewed the Medications.     Review of Systems  Anesthesia Hx:  Denies Family Hx of Anesthesia complications.   Denies Personal Hx of Anesthesia complications.   Social:  Non-Smoker    Hematology/Oncology:  Hematology Normal   Oncology Normal     EENT/Dental:EENT/Dental Normal   Cardiovascular:  Cardiovascular Normal     Pulmonary:  Pulmonary Normal    Renal/:  Renal/ Normal     Hepatic/GI:  Hepatic/GI Normal    Musculoskeletal:  Musculoskeletal Normal    Neurological:  Neurology Normal    Endocrine:   Diabetes Hypothyroidism    Dermatological:  Skin Normal    Psych:  Psychiatric Normal           Physical Exam    Airway:  Mallampati: III           Anesthesia Plan  Type of Anesthesia, risks & benefits discussed:    Anesthesia Type: MAC  Intra-op Monitoring Plan: Standard ASA Monitors  Post Op Pain Control Plan: multimodal analgesia  Informed Consent: Informed consent signed with the Patient and all parties understand the risks and agree with anesthesia plan.  All questions answered.   ASA Score: 3    Ready For Surgery From Anesthesia Perspective.     .

## 2022-09-22 NOTE — H&P
Pre-op Eye Surgery H&P     CC: Painless, progressive loss of vision  Present Illness :Corneal edema/opacity  Allergies/Current Meds: see meds  Mental Status: A&O x3  Pertinent Medical History: n/a     Physical Exam  General: NAD  HEENT: Eye white/quiet  Lungs: Adequate respirations  Heart: + pulses  Abdomen: soft  Rectal/GI/: deferred     Impression: Corneal Edema/opacity  Plan:Corneal Transplant

## 2022-09-23 ENCOUNTER — OFFICE VISIT (OUTPATIENT)
Dept: OPHTHALMOLOGY | Facility: CLINIC | Age: 37
End: 2022-09-23
Attending: OPHTHALMOLOGY
Payer: MEDICAID

## 2022-09-23 DIAGNOSIS — H18.623 KERATOCONUS, UNSTABLE, BILATERAL: ICD-10-CM

## 2022-09-23 DIAGNOSIS — Z98.890 POST-OPERATIVE STATE: Primary | ICD-10-CM

## 2022-09-23 PROCEDURE — 99213 OFFICE O/P EST LOW 20 MIN: CPT | Mod: PBBFAC | Performed by: OPHTHALMOLOGY

## 2022-09-23 PROCEDURE — 1159F PR MEDICATION LIST DOCUMENTED IN MEDICAL RECORD: ICD-10-PCS | Mod: CPTII,,, | Performed by: OPHTHALMOLOGY

## 2022-09-23 PROCEDURE — 1160F PR REVIEW ALL MEDS BY PRESCRIBER/CLIN PHARMACIST DOCUMENTED: ICD-10-PCS | Mod: CPTII,,, | Performed by: OPHTHALMOLOGY

## 2022-09-23 PROCEDURE — 1160F RVW MEDS BY RX/DR IN RCRD: CPT | Mod: CPTII,,, | Performed by: OPHTHALMOLOGY

## 2022-09-23 PROCEDURE — 99999 PR PBB SHADOW E&M-EST. PATIENT-LVL III: ICD-10-PCS | Mod: PBBFAC,,, | Performed by: OPHTHALMOLOGY

## 2022-09-23 PROCEDURE — 1159F MED LIST DOCD IN RCRD: CPT | Mod: CPTII,,, | Performed by: OPHTHALMOLOGY

## 2022-09-23 PROCEDURE — 99999 PR PBB SHADOW E&M-EST. PATIENT-LVL III: CPT | Mod: PBBFAC,,, | Performed by: OPHTHALMOLOGY

## 2022-09-23 PROCEDURE — 99024 POSTOP FOLLOW-UP VISIT: CPT | Mod: ,,, | Performed by: OPHTHALMOLOGY

## 2022-09-23 PROCEDURE — 99024 PR POST-OP FOLLOW-UP VISIT: ICD-10-PCS | Mod: ,,, | Performed by: OPHTHALMOLOGY

## 2022-09-23 RX ORDER — INSULIN GLARGINE 100 [IU]/ML
INJECTION, SOLUTION SUBCUTANEOUS
Status: ON HOLD | COMMUNITY
Start: 2021-06-07 | End: 2023-07-06 | Stop reason: HOSPADM

## 2022-09-23 RX ORDER — METFORMIN HYDROCHLORIDE 500 MG/1
500 TABLET ORAL
Status: ON HOLD | COMMUNITY
Start: 2021-06-07 | End: 2023-07-06 | Stop reason: HOSPADM

## 2022-09-23 RX ORDER — SEMAGLUTIDE 1.34 MG/ML
INJECTION, SOLUTION SUBCUTANEOUS
Status: ON HOLD | COMMUNITY
Start: 2022-04-21 | End: 2023-07-06 | Stop reason: HOSPADM

## 2022-09-23 NOTE — DISCHARGE SUMMARY
Outcome: Successful outpatient ophthalmic surgical procedure  Preprinted Instructions given to patient.  Regular diet.  Activity: No restrictions  Meds: see Med Rec  Condition: stable  Follow up: 1 day with Dr Beckett  Disposition: Home  Diagnosis: s/p eye surgery  Date of discharge: 09/23/2022

## 2022-09-23 NOTE — PROGRESS NOTES
HPI    1 day post op    S/p PKP OD 9/22/2022    Pain to touch OD, tearing and no f/f.  Last edited by Cuca Herrera on 9/23/2022  9:27 AM.            Assessment /Plan     For exam results, see Encounter Report.    Post-operative state    Keratoconus, unstable, bilateral      POD1 PKP: Wound stable. Graft with normal edema, folds. Post operative precautions reviewed.                      none

## 2022-09-26 ENCOUNTER — TELEPHONE (OUTPATIENT)
Dept: OPHTHALMOLOGY | Facility: CLINIC | Age: 37
End: 2022-09-26
Payer: MEDICAID

## 2022-09-26 NOTE — TELEPHONE ENCOUNTER
Spoke with patient letting her know I will contact  and see if he can give her something for her yeast infection after taking antibiotic

## 2022-09-26 NOTE — TELEPHONE ENCOUNTER
----- Message from Sharri Umanzor sent at 9/26/2022  9:34 AM CDT -----  Regarding: Speak with office  Contact: Yuri Mitchell is calling to speak with office about getting medication called in for her.    [phone]

## 2022-09-28 ENCOUNTER — OFFICE VISIT (OUTPATIENT)
Dept: OPHTHALMOLOGY | Facility: CLINIC | Age: 37
End: 2022-09-28
Payer: MEDICAID

## 2022-09-28 DIAGNOSIS — Z94.7 STATUS POST CORNEAL TRANSPLANT: Primary | ICD-10-CM

## 2022-09-28 DIAGNOSIS — H18.623 KERATOCONUS, UNSTABLE, BILATERAL: ICD-10-CM

## 2022-09-28 PROCEDURE — 1159F MED LIST DOCD IN RCRD: CPT | Mod: CPTII,,, | Performed by: OPHTHALMOLOGY

## 2022-09-28 PROCEDURE — 99999 PR PBB SHADOW E&M-EST. PATIENT-LVL II: CPT | Mod: PBBFAC,,, | Performed by: OPHTHALMOLOGY

## 2022-09-28 PROCEDURE — 1160F RVW MEDS BY RX/DR IN RCRD: CPT | Mod: CPTII,,, | Performed by: OPHTHALMOLOGY

## 2022-09-28 PROCEDURE — 99212 OFFICE O/P EST SF 10 MIN: CPT | Mod: PBBFAC | Performed by: OPHTHALMOLOGY

## 2022-09-28 PROCEDURE — 99999 PR PBB SHADOW E&M-EST. PATIENT-LVL II: ICD-10-PCS | Mod: PBBFAC,,, | Performed by: OPHTHALMOLOGY

## 2022-09-28 PROCEDURE — 1160F PR REVIEW ALL MEDS BY PRESCRIBER/CLIN PHARMACIST DOCUMENTED: ICD-10-PCS | Mod: CPTII,,, | Performed by: OPHTHALMOLOGY

## 2022-09-28 PROCEDURE — 1159F PR MEDICATION LIST DOCUMENTED IN MEDICAL RECORD: ICD-10-PCS | Mod: CPTII,,, | Performed by: OPHTHALMOLOGY

## 2022-09-28 PROCEDURE — 99024 POSTOP FOLLOW-UP VISIT: CPT | Mod: ,,, | Performed by: OPHTHALMOLOGY

## 2022-09-28 PROCEDURE — 99024 PR POST-OP FOLLOW-UP VISIT: ICD-10-PCS | Mod: ,,, | Performed by: OPHTHALMOLOGY

## 2022-09-28 NOTE — PROGRESS NOTES
HPI    1 week Post op     S/p PKP OD 9/22/2022    Pain to touch OD, tearing and no f/f.  Ofloxacin TID  Pred forte TIID  Last edited by Naseem Alejandre, PCT on 9/28/2022  2:18 PM.            Assessment /Plan     For exam results, see Encounter Report.    Status post corneal transplant    Keratoconus, unstable, bilateral      POW1 PKP OD with few folds and mostly epithelialized  PF qid  3-4 weeks

## 2022-09-29 LAB
FINAL PATHOLOGIC DIAGNOSIS: NORMAL
GROSS: NORMAL
Lab: NORMAL
MICROSCOPIC EXAM: NORMAL

## 2022-10-26 ENCOUNTER — OFFICE VISIT (OUTPATIENT)
Dept: OPHTHALMOLOGY | Facility: CLINIC | Age: 37
End: 2022-10-26
Payer: MEDICAID

## 2022-10-26 DIAGNOSIS — Z94.7 STATUS POST CORNEAL TRANSPLANT: Primary | ICD-10-CM

## 2022-10-26 PROCEDURE — 99213 OFFICE O/P EST LOW 20 MIN: CPT | Mod: PBBFAC | Performed by: OPHTHALMOLOGY

## 2022-10-26 PROCEDURE — 99999 PR PBB SHADOW E&M-EST. PATIENT-LVL III: CPT | Mod: PBBFAC,,, | Performed by: OPHTHALMOLOGY

## 2022-10-26 PROCEDURE — 99999 PR PBB SHADOW E&M-EST. PATIENT-LVL III: ICD-10-PCS | Mod: PBBFAC,,, | Performed by: OPHTHALMOLOGY

## 2022-10-26 PROCEDURE — 99024 PR POST-OP FOLLOW-UP VISIT: ICD-10-PCS | Mod: ,,, | Performed by: OPHTHALMOLOGY

## 2022-10-26 PROCEDURE — 99024 POSTOP FOLLOW-UP VISIT: CPT | Mod: ,,, | Performed by: OPHTHALMOLOGY

## 2022-10-26 RX ORDER — DIFLUPREDNATE OPHTHALMIC 0.5 MG/ML
1 EMULSION OPHTHALMIC 4 TIMES DAILY
Qty: 3 ML | Refills: 6 | Status: SHIPPED | OUTPATIENT
Start: 2022-10-26 | End: 2022-11-05

## 2022-10-26 NOTE — PROGRESS NOTES
HPI    4mo Post op     S/p PKP OD 9/22/2022    Pain to touch OD, tearing and no f/f.  Ofloxacin TID  Pred forte TIID  Last edited by Eder Holbrook on 10/26/2022 11:31 AM.            Assessment /Plan     For exam results, see Encounter Report.    Status post corneal transplant  -     difluprednate (DUREZOL) 0.05 % Drop ophthalmic solution; Place 1 drop into the right eye 4 (four) times daily. for 10 days  Dispense: 3 mL; Refill: 6    1 month post PKP with new large pigments on endo  Use Durezol qid (vs PF)  IOP good

## 2022-11-16 ENCOUNTER — CLINICAL SUPPORT (OUTPATIENT)
Dept: BARIATRICS | Facility: HOSPITAL | Age: 37
End: 2022-11-16
Payer: MEDICAID

## 2022-11-16 ENCOUNTER — LAB VISIT (OUTPATIENT)
Dept: LAB | Facility: HOSPITAL | Age: 37
End: 2022-11-16
Attending: SURGERY
Payer: MEDICAID

## 2022-11-16 DIAGNOSIS — K27.9 PEPTIC ULCER DISEASE: Primary | ICD-10-CM

## 2022-11-16 PROCEDURE — 36415 COLL VENOUS BLD VENIPUNCTURE: CPT

## 2022-11-16 PROCEDURE — 83013 H PYLORI (C-13) BREATH: CPT

## 2022-11-16 NOTE — PROGRESS NOTES
NUTRITIONAL CONSULT    Initial assessment for sleeve gastrectomy   Non Surgical: []      PAST HISTORY:   Dieting attempts include: has struggled with her weight after each child, she has lost weight by exercise and cutting out sweets but cannot keep it off.   Highest weight: 300    CLINICAL DATA:  Height: 62in  Weight: 258.1lb lbs  IBW: 110 lbs  BMI: 46.7   Bariatric goal weight (125% EBW): 137.5lb lbs  Patient's goal weight: 180 lbs    FAMILY HISTORY OF OBESITY:   [x]Yes    []No            WHAT SIDE OF THE FAMILY?   [x]Mother   [x]Father   [x]Sibling   []Child     []Extended    []Adopted       Goal for Bariatric Surgery: to improve health, to improve quality of life, to lose weight, and to prevent future medical conditions    NUTRITION & HEALTH HISTORY:  Greatest challenge: sweets, starchy CHO, and irregular meal patterns    Current diet recall:   Skip meals   L: bowl of cereal and milk  Cranberry juice     Current Dietary Patterns:  Meal pattern: 1-2 meals  Snackin / day Type:   Vegetables: Likes a few. Eats 2-3 times per week.  Fruits: Likes a few. Eats 2-3 times per week.  Beverages: water, soda, and sugary beverages  Alcohol consumption: Never. Type:   Dining out: Reduced lately. Mostly fast food, restaurants, and take-out.  Grocery shopping and food prep: []Self   [x]Spouse   []Other:   Emotional eating: []Yes   [x]No Which emotions if yes:  Nighttime snacking: []Yes   [x]No Middle of night: []Yes   []No  Before bedtime: []Yes   []No  Hx of disordered eating behaviors: []Yes   [x]No Anorexia: []Yes   []No Bulimia: []Yes   []No  Purging: []Yes   []No Binging: []Yes   [x]No  History of vitamin/mineral deficiencies:   []Yes   [x]No    Vitamins / Minerals / Herbs:   None     Food Allergies:   None       ASSESSMENT:  Patient reports attempts at weight loss, only to regain lost weight.  Patient demonstrated knowledge of healthy eating behaviors and exercise patterns; admits to not eating healthy and not  exercising at this point.  Patient states willingness to change lifestyle and make behavior modifications.  Expect good  compliance after surgery at this time.        ESTIMATED NEEDS:  Calories: 9842-2998  (20-25 kcal/kg adjusted BW/d)  Protein:  62-68  (1.0-1.1 g/kg adjusted BW/d)  Fluid:   1240  (20 mL/kg actual BW/d)    BARIATRIC DIET DISCUSSION:  Discussed diet after surgery and related to patients food record.  Reviewed diet progression before and after surgery.  Reinforced that surgery is not a magic bullet and importance of low fat foods and no snacking.  Answered all questions.    RECOMMENDATIONS:  Patient is a good candidate for bariatric surgery.      PLAN:  Continue to review Bariatric Nutrition Guidebook at home and call with any questions.  Work on expanding variety of vegetables.  Work on gradually cutting back on starchy CHO in the diet.  5-6 meals per day.  Start including protein supplements in the diet plan daily.  More grocery shopping and meal preparation at home.  Reduce soda and sugar beverages   Eat 3 meals per day with protein

## 2022-11-17 LAB — MAYO GENERIC ORDERABLE RESULT: ABNORMAL

## 2022-11-17 NOTE — PROGRESS NOTES
BEHAVIOR MODIFICATION AND EXERCISE CONSULT    Non Surgical: [] Surgical: [x]      PERSONAL:     What initiated your interest in bariatric surgery?   Health    Marital Status: []Single   [x]   []   []      Do you have children? 3    What is your highest level of education completed? 12th grade    Who is your social or relational support?  and family    Do you work? 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 Love my job      PHYSICAL ACTIVITY:    Do you currently exercise: []Yes   [x]No    If so, please describe:    Have you experienced any injuries and/or restrictions that may limit your physical activity? []Yes   [x]No      If so, please describe:     How do you feel about exercise? If I get the energy to do it I would.     BEHAVIORS:    What behavior(s) would you like to change in order to be healthy? Eating habits, healthier foods and drinks.    On a scale of 1-10 (1-extremely low, 10-extremely high), how motivated are you to change your behavior(s)? 10    Do you currently use any type of tobacco products (vape, dip, cigarettes, etc.) No    If yes, on average, how many and/or how often do you use these products on a daily basis?    How many hours of sleep do you average? 8    Rate your stress level on a scale of 1-10 (1-extremely low, 10-extremely high) 5    What is your biggest stressor? Baby girl    Is your appetite affected by stress? Yes, don't eat.     How do you cope and/or manage stress?    Have a counselor or therapist in the past? A month ago.       NOTES: A body composition was not done due to consult being over the phone on 11/17/22. Patient's highest weight is 258 lbs. Her goal is to weigh 180 lbs.    Myrna Narayan, RICO, CPT, CHC

## 2022-11-18 DIAGNOSIS — K21.9 GERD WITHOUT ESOPHAGITIS: Primary | ICD-10-CM

## 2022-11-29 ENCOUNTER — HOSPITAL ENCOUNTER (OUTPATIENT)
Dept: RADIOLOGY | Facility: HOSPITAL | Age: 37
Discharge: HOME | End: 2022-11-29
Attending: SURGERY
Payer: MEDICAID

## 2022-11-29 DIAGNOSIS — K21.9 GERD WITHOUT ESOPHAGITIS: ICD-10-CM

## 2022-11-29 PROCEDURE — A9698 NON-RAD CONTRAST MATERIALNOC: HCPCS | Performed by: SURGERY

## 2022-11-29 PROCEDURE — 25500020 PHARM REV CODE 255: Performed by: SURGERY

## 2022-11-29 PROCEDURE — 74240 X-RAY XM UPR GI TRC 1CNTRST: CPT | Mod: TC

## 2022-11-29 RX ADMIN — BARIUM SULFATE 200 ML: 980 POWDER, FOR SUSPENSION ORAL at 10:11

## 2022-11-29 RX ADMIN — Medication 176 G: at 10:11

## 2022-12-22 ENCOUNTER — CLINICAL SUPPORT (OUTPATIENT)
Dept: BARIATRICS | Facility: HOSPITAL | Age: 37
End: 2022-12-22

## 2022-12-22 VITALS — BODY MASS INDEX: 47.49 KG/M2 | WEIGHT: 255.5 LBS

## 2022-12-22 PROCEDURE — 94200034 HC BARIATRIC NUTRITIONAL SVCS PT GRADE I

## 2022-12-22 NOTE — PROGRESS NOTES
Patient Education        [x]   MSWL Visit: 1/3 []     NSWL Visit:     Current Weight:  255lb                                                                      Barriers to learning:  []None evident  []Acuity of illness  []Cognitive defects  []Cultural barriers  []Desire/Motivation  []Difficulty concentrating  []Emotional state  []Financial concerns  []Hearing deficit  []Language barrier  []Literacy  []Memory problems  []Vision impairment     Home caregiver present for session   []YES  [x] NO       Teaching methods:  []Demonstration  []Explanation  []Printed materials  []Teach back  []Virtual/web based         Verbalizes understanding Demonstrates  Needs further teaching Needs practice/ supervision Comments    Bariatric Surgery Diet  [] [] [] []    Clear liquid [] [] [] []    Full liquid [] [] [] []    Weight Reduction [] [] [] []    Other Diet [] [] [] []          Additional Learner (s) Present                                                                  []Spouse   []Daughter    []  Son  []Family member   []Friend   []Grandfather   []Grandmother   []Father   []Mother   []Other       Expected Compliance:  [x]Good  []Fair  []Poor    Additional Information:  this is Pts first visit since her consult. She has lost 3lbs. Pt ha started to watch her porions and limit sugar beverages. Pt still skips meals but is eating a lunch and dinner now.   We dicussed food items for her to have for breakfast and for her to start measuring out starches at each meal. Gave mod. Pre op diet to help get her started.       24 hr recall:  B: Skip   L:  beans and rice   D: beans and rice   Water and powder  70-100oz     Plan:   1. Et a breakfast with protein ( protein shake)   2. Limit starches to 1/3 cup per meal

## 2023-01-05 PROBLEM — B96.81 HELICOBACTER PYLORI (H. PYLORI) AS THE CAUSE OF DISEASES CLASSIFIED ELSEWHERE: Status: ACTIVE | Noted: 2023-01-05

## 2023-01-31 ENCOUNTER — CLINICAL SUPPORT (OUTPATIENT)
Dept: BARIATRICS | Facility: HOSPITAL | Age: 38
End: 2023-01-31

## 2023-01-31 VITALS — BODY MASS INDEX: 48.52 KG/M2 | WEIGHT: 261 LBS

## 2023-01-31 PROCEDURE — 94200034 HC BARIATRIC NUTRITIONAL SVCS PT GRADE I

## 2023-01-31 NOTE — PROGRESS NOTES
Patient Education [x] MSWL Visit Number:  2/3 JAIDEN     []  Pre-op Wt Loss Goal (as ordered on referral):   [] NSWL Visit:     Current Wt: 261lb  Current BMI:  48.52                                                                      Barriers to learning:  [x]None evident  []Acuity of illness  []Cognitive defects  []Cultural barriers  []Desire/Motivation  []Difficulty concentrating  []Emotional state  []Financial concerns  []Hearing deficit  []Language barrier  []Literacy  []Memory problems  []Vision impairment     Home caregiver present for session   []YES  [] NO       Teaching methods:  []Demonstration  [x]Explanation  [x]Printed materials  []Teach back  []Virtual/web based         Verbalizes understanding Demonstrates  Needs further teaching Needs practice/ supervision Comments    Bariatric Surgery Diet  [] [] [] []    Clear liquid [] [] [] []    Full liquid [] [] [] []    Weight Reduction [x] [] [] []    Other Diet [] [] [] []          Additional Learner (s) Present                                                                  []Spouse   []Daughter    []  Son  []Family member   []Friend   []Grandfather   []Grandmother   []Father   []Mother   []Other       Expected Compliance:  [x]Good  []Fair  []Poor    Additional Information:   Pt has had a stressful couple weeks with family and has not been drinking as much water. She is working on getting more consistent with her meal times to make sure she gets in 3 meals. Recd that she add in  1 snack to make sure seh gets her protein in every day. Pt will also keep water with her at all times to make sure that she meets goal.     24 hr recall:   730am B: premier protein shake    12ish-1 L: Broccoli and meat   630/7: D: meat vegetables rice   No soda just SF beverages       Plan:   1. At 3 meals 1 snack- protein every meal   2. Exercise at least 3x per week

## 2023-02-28 ENCOUNTER — CLINICAL SUPPORT (OUTPATIENT)
Dept: BARIATRICS | Facility: HOSPITAL | Age: 38
End: 2023-02-28

## 2023-02-28 VITALS — BODY MASS INDEX: 49.3 KG/M2 | WEIGHT: 265.19 LBS

## 2023-02-28 PROCEDURE — 94200034 HC BARIATRIC NUTRITIONAL SVCS PT GRADE I

## 2023-02-28 NOTE — PROGRESS NOTES
Patient Education [x] MSWL Visit Number:  3/3 Bassam     []  Pre-op Wt Loss Goal (as ordered on referral):   [] NSWL Visit:     Current Wt:  265lb  Current BMI: 49.30                                                                      Barriers to learning:  [x]None evident  []Acuity of illness  []Cognitive defects  []Cultural barriers  []Desire/Motivation  []Difficulty concentrating  []Emotional state  []Financial concerns  []Hearing deficit  []Language barrier  []Literacy  []Memory problems  []Vision impairment     Home caregiver present for session   []YES  [] NO       Teaching methods:  []Demonstration  []Explanation  []Printed materials  []Teach back  []Virtual/web based         Verbalizes understanding Demonstrates  Needs further teaching Needs practice/ supervision Comments    Bariatric Surgery Diet  [] [] [] []    Clear liquid [] [] [] []    Full liquid [] [] [] []    Weight Reduction [] [] [] []    Other Diet [] [] [] []          Additional Learner (s) Present                                                                  []Spouse   []Daughter    []  Son  []Family member   []Friend   []Grandfather   []Grandmother   []Father   []Mother   []Other       Expected Compliance:  [x]Good  []Fair  []Poor    Additional Information:   Pt on pain med that she states that it has caused her to have an increased appetite and snacking after dinner. Overall her eating during the day is on tack, frequent meals and protein almost every meal. She has reduce soda intake and will work on continuing soda intake to none and increase water.         24 hr recall:  B: core power   S: grapes and strawberries   L: TV dinner ( meatloaf and mashed potatoes)   D: TV diner or core power   S: Chips or candy     Stats this  night snacking is from pain med  and she will stop that and go back to 800mg motrin       Plan:   1. Increase protein at snack   2. Increase water to 70oz   3. Limit night snacking

## 2023-03-22 ENCOUNTER — CLINICAL SUPPORT (OUTPATIENT)
Dept: BARIATRICS | Facility: HOSPITAL | Age: 38
End: 2023-03-22

## 2023-03-22 VITALS — BODY MASS INDEX: 49.39 KG/M2 | WEIGHT: 265.69 LBS

## 2023-04-19 ENCOUNTER — CLINICAL SUPPORT (OUTPATIENT)
Dept: BARIATRICS | Facility: HOSPITAL | Age: 38
End: 2023-04-19

## 2023-04-19 VITALS — BODY MASS INDEX: 51.03 KG/M2 | WEIGHT: 274.5 LBS

## 2023-04-19 NOTE — PROGRESS NOTES
Monthly weight check   Pt has gained 10lbs over the month     2674.4lb     We will bring her back in for a visit and weight loss.   Gave vher mod pre po diet to follow and told her to watch sodium, alcohol and starches

## 2023-05-08 ENCOUNTER — TELEPHONE (OUTPATIENT)
Dept: OPHTHALMOLOGY | Facility: CLINIC | Age: 38
End: 2023-05-08
Payer: MEDICAID

## 2023-06-06 ENCOUNTER — CLINICAL SUPPORT (OUTPATIENT)
Dept: BARIATRICS | Facility: HOSPITAL | Age: 38
End: 2023-06-06

## 2023-06-06 NOTE — PROGRESS NOTES
Date of education: 06/06/2023  Pt education type: [x]Pre op  []Post op  Surgery date: 07/05/2023  Type of surgery: sleeve gastrectomy     Education was provided on:   [x]Importance of protein and vitamin protocol  [x]Importance of drinking  fl oz/day of non carbonated sugar free non caffeinated beverages  [x]Importance of following dietary protocol  []Importance of early ambulation postop   []Use of incentive spirometer 10 times an hour while awake  []Non opiod pain management post op   []Discontinuing use of meds containing aspirin, ibuprofen, NSAIDs, post op  []Signs and symptoms of immediate and long term complications post-op  []Prevention and signs and symptoms of blood clots   []Prevention and signs of infection  []Reviewed medication regimen  []Importance of adhering to behavioral changes  []Importance of following exercise protocol      Barriers to learning:  [x]None evident  []Acuity of illness  []Cognitive defects  []Cultural barriers  []Desire/Motivation  []Difficulty concentrating  []Emotional state  []Financial concerns  []Hearing deficit  []Language barrier  []Literacy  []Memory problems  []Vision impairment     Teaching methods:  []Demonstration  [x]Explanation  [x]Printed materials  [x]Teach back  []Virtual/web based    Expected Compliance:  [x]Good  []Fair  []Poor    Additional Notes:

## 2023-06-07 NOTE — PROGRESS NOTES
Date of education: 6/6/23  Pt education type: [x]Pre op  []Post op  Surgery date: 7/5/23  Type of surgery: sleeve gastrectomy     Education was provided on:   []Importance of protein and vitamin protocol  []Importance of drinking  fl oz/day of non carbonated sugar free non caffeinated beverages  []Importance of following dietary protocol  []Importance of early ambulation postop   []Use of incentive spirometer 10 times an hour while awake  []Non opiod pain management post op   []Discontinuing use of meds containing aspirin, ibuprofen, NSAIDs, post op  []Signs and symptoms of immediate and long term complications post-op  []Prevention and signs and symptoms of blood clots   []Prevention and signs of infection  []Reviewed medication regimen  [x]Importance of adhering to behavioral changes  [x]Importance of following exercise protocol      Barriers to learning:  [x]None evident  []Acuity of illness  []Cognitive defects  []Cultural barriers  []Desire/Motivation  []Difficulty concentrating  []Emotional state  []Financial concerns  []Hearing deficit  []Language barrier  []Literacy  []Memory problems  []Vision impairment     Teaching methods:  []Demonstration  []Explanation  []Printed materials  []Teach back  [x]Virtual/web based (recorded video of behavior mod/exercise)    Expected Compliance:  [x]Good  []Fair  []Poor    Additional Notes:  Additional Notes:A body composition was conducted and patient was educated on results. Patient did attend preop class and heard the recorded video portion of behavior mod/exercise guidelines.    N- 18  L arm-14.5  Chest-55.5  Waist-57  Hip-58.5  L calf- 18    RICO Cardona, CPT, CHC

## 2023-06-21 ENCOUNTER — CLINICAL SUPPORT (OUTPATIENT)
Dept: BARIATRICS | Facility: HOSPITAL | Age: 38
End: 2023-06-21

## 2023-06-21 ENCOUNTER — LAB VISIT (OUTPATIENT)
Dept: LAB | Facility: HOSPITAL | Age: 38
End: 2023-06-21
Attending: SURGERY
Payer: MEDICAID

## 2023-06-21 VITALS — WEIGHT: 286 LBS | BODY MASS INDEX: 52.63 KG/M2 | WEIGHT: 286 LBS | HEIGHT: 62 IN | BODY MASS INDEX: 53.16 KG/M2

## 2023-06-21 DIAGNOSIS — Z01.818 PRE-OP EXAM: ICD-10-CM

## 2023-06-21 DIAGNOSIS — Z01.811 PRE-OP CHEST EXAM: Primary | ICD-10-CM

## 2023-06-21 LAB
ALBUMIN SERPL-MCNC: 3.7 G/DL (ref 3.5–5)
ALBUMIN/GLOB SERPL: 0.9 RATIO (ref 1.1–2)
ALP SERPL-CCNC: 83 UNIT/L (ref 40–150)
ALT SERPL-CCNC: 19 UNIT/L (ref 0–55)
APTT PPP: 24.5 SECONDS
AST SERPL-CCNC: 15 UNIT/L (ref 5–34)
BASOPHILS # BLD AUTO: 0.07 X10(3)/MCL
BASOPHILS NFR BLD AUTO: 0.8 %
BILIRUBIN DIRECT+TOT PNL SERPL-MCNC: 0.3 MG/DL
BUN SERPL-MCNC: 11.1 MG/DL (ref 7–18.7)
CALCIUM SERPL-MCNC: 9.2 MG/DL (ref 8.4–10.2)
CHLORIDE SERPL-SCNC: 104 MMOL/L (ref 98–107)
CO2 SERPL-SCNC: 28 MMOL/L (ref 22–29)
CREAT SERPL-MCNC: 0.98 MG/DL (ref 0.55–1.02)
EOSINOPHIL # BLD AUTO: 0.07 X10(3)/MCL (ref 0–0.9)
EOSINOPHIL NFR BLD AUTO: 0.8 %
ERYTHROCYTE [DISTWIDTH] IN BLOOD BY AUTOMATED COUNT: 14.1 % (ref 11.5–17)
GFR SERPLBLD CREATININE-BSD FMLA CKD-EPI: >60 MLS/MIN/1.73/M2
GLOBULIN SER-MCNC: 4.1 GM/DL (ref 2.4–3.5)
GLUCOSE SERPL-MCNC: 174 MG/DL (ref 74–100)
HCT VFR BLD AUTO: 39 % (ref 37–47)
HGB BLD-MCNC: 12.7 G/DL (ref 12–16)
IMM GRANULOCYTES # BLD AUTO: 0.04 X10(3)/MCL (ref 0–0.04)
IMM GRANULOCYTES NFR BLD AUTO: 0.5 %
LYMPHOCYTES # BLD AUTO: 3.31 X10(3)/MCL (ref 0.6–4.6)
LYMPHOCYTES NFR BLD AUTO: 39.8 %
MCH RBC QN AUTO: 30.3 PG (ref 27–31)
MCHC RBC AUTO-ENTMCNC: 32.6 G/DL (ref 33–36)
MCV RBC AUTO: 93.1 FL (ref 80–94)
MONOCYTES # BLD AUTO: 0.46 X10(3)/MCL (ref 0.1–1.3)
MONOCYTES NFR BLD AUTO: 5.5 %
NEUTROPHILS # BLD AUTO: 4.37 X10(3)/MCL (ref 2.1–9.2)
NEUTROPHILS NFR BLD AUTO: 52.6 %
NRBC BLD AUTO-RTO: 0 %
PLATELET # BLD AUTO: 287 X10(3)/MCL (ref 130–400)
PMV BLD AUTO: 10.3 FL (ref 7.4–10.4)
POTASSIUM SERPL-SCNC: 4.3 MMOL/L (ref 3.5–5.1)
PROT SERPL-MCNC: 7.8 GM/DL (ref 6.4–8.3)
RBC # BLD AUTO: 4.19 X10(6)/MCL (ref 4.2–5.4)
SODIUM SERPL-SCNC: 138 MMOL/L (ref 136–145)
WBC # SPEC AUTO: 8.32 X10(3)/MCL (ref 4.5–11.5)

## 2023-06-21 PROCEDURE — 85730 THROMBOPLASTIN TIME PARTIAL: CPT

## 2023-06-21 PROCEDURE — 80053 COMPREHEN METABOLIC PANEL: CPT

## 2023-06-21 PROCEDURE — 85025 COMPLETE CBC W/AUTO DIFF WBC: CPT

## 2023-06-21 PROCEDURE — 36415 COLL VENOUS BLD VENIPUNCTURE: CPT

## 2023-06-21 NOTE — PROGRESS NOTES
Patient attended preop education visit with team. Patient missed 4 on questions 13-18. Corrections were discussed. Patient has gained 3 lbs. She is eating isra chips for snack at times. No starches are allowed on preop diet.     RICO Cardona, CPT, CHC

## 2023-06-21 NOTE — PROGRESS NOTES
Pt attended pre-op visit with bariatric team and completed questionnaire. Pre- and immediate post-op guidelines were reviewed. Pt confirmed knowledge and expressed understanding.     Weight: 286#  Weight loss since pre-op class:  +3#    Pt was advised to limit fruit to 1 serving per day, 1/4 c. Pt also advised to eliminate isra chips as this is not allowed on pre-op diet. Pt also advised to stop fruit 1 week prior to surgery.

## 2023-06-26 ENCOUNTER — CLINICAL SUPPORT (OUTPATIENT)
Dept: BARIATRICS | Facility: HOSPITAL | Age: 38
End: 2023-06-26

## 2023-06-27 VITALS — WEIGHT: 285 LBS | BODY MASS INDEX: 52.44 KG/M2 | HEIGHT: 62 IN

## 2023-07-03 ENCOUNTER — ANESTHESIA EVENT (OUTPATIENT)
Dept: SURGERY | Facility: HOSPITAL | Age: 38
DRG: 621 | End: 2023-07-03
Payer: MEDICAID

## 2023-07-03 NOTE — ANESTHESIA PREPROCEDURE EVALUATION
07/03/2023  Yuri Mijares is a 37 y.o., female     For sleeve gastrectomy    UPT neg DOS    History of DM2, SMO, post surgical hypothyroidism, ALEX? , GERD      Vitals:    07/05/23 0550 07/05/23 0608 07/05/23 0702 07/05/23 0843   BP: 104/69  104/69 107/76   Pulse: 103   94   Resp:    20   Temp: 37 °C (98.6 °F)   36.3 °C (97.3 °F)   TempSrc: Oral   Temporal   SpO2: 97%   100%   Weight:  128.2 kg (282 lb 9.6 oz)           Active Ambulatory Problems     Diagnosis Date Noted    Nontoxic simple goiter 09/15/2016    Morbid obesity with BMI of 45.0-49.9, adult 04/24/2017    Oropharyngeal dysphagia 04/24/2017    Thyroid nodule 05/29/2017    Postsurgical hypothyroidism 09/06/2017    IGT (impaired glucose tolerance) 06/17/2019    Keratoconus, unstable, bilateral 09/22/2022    Helicobacter pylori (H. pylori) as the cause of diseases classified elsewhere 01/05/2023     Resolved Ambulatory Problems     Diagnosis Date Noted    16 weeks gestation of pregnancy 09/15/2016    Abnormal resting ECG findings 04/25/2017    Preoperative evaluation to rule out surgical contraindication 04/25/2017     Past Medical History:   Diagnosis Date    Diabetes mellitus     Hypertension     Morbid obesity with BMI of 40.0-44.9, adult 04/24/2017    ALEX (obstructive sleep apnea)     Panic attacks          Past Surgical History:   Procedure Laterality Date    ESOPHAGOGASTRODUODENOSCOPY N/A 1/5/2023    Procedure: EGD w/ ROBERT;  Surgeon: Vineet Corrigan Jr., MD;  Location: [facility] OR;  Service: General;  Laterality: N/A;    PENETRATING KERATOPLASTY Right 9/22/2022    Procedure: KERATOPLASTY, PENETRATING;  Surgeon: Cristal Beckett MD;  Location: [facility] OR;  Service: Ophthalmology;  Laterality: Right;    s/p cross linking left eye      TOTAL THYROIDECTOMY  06/15/2017       Lab Results   Component Value Date    WBC 8.32  06/21/2023    HGB 12.7 06/21/2023    HCT 39.0 06/21/2023     06/21/2023    ALT 19 06/21/2023    AST 15 06/21/2023     06/21/2023    K 4.3 06/21/2023     05/19/2020    CREATININE 0.98 06/21/2023    BUN 11.1 06/21/2023    CO2 28 06/21/2023    TSH 0.134 (L) 05/19/2020    INR 1.1 04/24/2017    HGBA1C 5.9 (H) 05/19/2020       Pre-op Assessment    I have reviewed the Patient Summary Reports.     I have reviewed the Nursing Notes. I have reviewed the NPO Status.   I have reviewed the Medications.     Review of Systems  Anesthesia Hx:  No problems with previous Anesthesia  History of prior surgery of interest to airway management or planning: Denies Family Hx of Anesthesia complications.   Denies Personal Hx of Anesthesia complications.   Hematology/Oncology:  Hematology Normal   Oncology Normal     EENT/Dental:EENT/Dental Normal   Cardiovascular:  Cardiovascular Normal     Pulmonary:  Pulmonary Normal    Renal/:  Renal/ Normal     Hepatic/GI:  Hepatic/GI Normal    Musculoskeletal:  Musculoskeletal Normal    Neurological:  Neurology Normal    Endocrine:  Endocrine Normal    Dermatological:  Skin Normal    Psych:  Psychiatric Normal           Physical Exam  General: Well nourished, Cooperative, Alert and Oriented    Airway:  Mallampati: I / I  Mouth Opening: Normal  TM Distance: Normal  Tongue: Normal  Neck ROM: Normal ROM    Dental:  Intact        Anesthesia Plan  Type of Anesthesia, risks & benefits discussed:    Anesthesia Type: Gen ETT  Intra-op Monitoring Plan: Standard ASA Monitors  Post Op Pain Control Plan: multimodal analgesia and IV/PO Opioids PRN  Induction:  IV  Airway Plan: Direct  Informed Consent: Informed consent signed with the Patient and all parties understand the risks and agree with anesthesia plan.  All questions answered. Patient consented to blood products? No  ASA Score: 3  Day of Surgery Review of History & Physical: H&P Update referred to the surgeon/provider.    Ready For  Surgery From Anesthesia Perspective.     .

## 2023-07-05 ENCOUNTER — HOSPITAL ENCOUNTER (INPATIENT)
Facility: HOSPITAL | Age: 38
LOS: 1 days | Discharge: HOME | DRG: 621 | End: 2023-07-06
Attending: SURGERY | Admitting: SURGERY
Payer: MEDICAID

## 2023-07-05 ENCOUNTER — ANESTHESIA (OUTPATIENT)
Dept: SURGERY | Facility: HOSPITAL | Age: 38
DRG: 621 | End: 2023-07-05
Payer: MEDICAID

## 2023-07-05 DIAGNOSIS — E66.01 MORBID OBESITY WITH BMI OF 45.0-49.9, ADULT: Primary | Chronic | ICD-10-CM

## 2023-07-05 DIAGNOSIS — E66.01 MORBID OBESITY: ICD-10-CM

## 2023-07-05 LAB
ABORH RETYPE: NORMAL
B-HCG UR QL: NEGATIVE
CTP QC/QA: YES
GROUP & RH: NORMAL
INDIRECT COOMBS GEL: NORMAL
POCT GLUCOSE: 156 MG/DL (ref 70–110)
POCT GLUCOSE: 211 MG/DL (ref 70–110)
SPECIMEN OUTDATE: NORMAL

## 2023-07-05 PROCEDURE — 11000001 HC ACUTE MED/SURG PRIVATE ROOM

## 2023-07-05 PROCEDURE — 88307 TISSUE EXAM BY PATHOLOGIST: CPT | Mod: TC | Performed by: SURGERY

## 2023-07-05 PROCEDURE — 63600175 PHARM REV CODE 636 W HCPCS

## 2023-07-05 PROCEDURE — 37000009 HC ANESTHESIA EA ADD 15 MINS: Performed by: SURGERY

## 2023-07-05 PROCEDURE — 27201423 OPTIME MED/SURG SUP & DEVICES STERILE SUPPLY: Performed by: SURGERY

## 2023-07-05 PROCEDURE — 37000008 HC ANESTHESIA 1ST 15 MINUTES: Performed by: SURGERY

## 2023-07-05 PROCEDURE — 88312 SPECIAL STAINS GROUP 1: CPT | Mod: TC | Performed by: SURGERY

## 2023-07-05 PROCEDURE — D9220A PRA ANESTHESIA: Mod: CRNA,,, | Performed by: NURSE ANESTHETIST, CERTIFIED REGISTERED

## 2023-07-05 PROCEDURE — 25000003 PHARM REV CODE 250: Performed by: SURGERY

## 2023-07-05 PROCEDURE — 63600175 PHARM REV CODE 636 W HCPCS: Performed by: SURGERY

## 2023-07-05 PROCEDURE — 82962 GLUCOSE BLOOD TEST: CPT | Performed by: SURGERY

## 2023-07-05 PROCEDURE — 36000711: Performed by: SURGERY

## 2023-07-05 PROCEDURE — 81025 URINE PREGNANCY TEST: CPT | Performed by: SURGERY

## 2023-07-05 PROCEDURE — D9220A PRA ANESTHESIA: ICD-10-PCS | Mod: CRNA,,, | Performed by: NURSE ANESTHETIST, CERTIFIED REGISTERED

## 2023-07-05 PROCEDURE — 63600175 PHARM REV CODE 636 W HCPCS: Performed by: SPECIALIST

## 2023-07-05 PROCEDURE — D9220A PRA ANESTHESIA: Mod: ANES,,, | Performed by: SPECIALIST

## 2023-07-05 PROCEDURE — 99900035 HC TECH TIME PER 15 MIN (STAT)

## 2023-07-05 PROCEDURE — 25000003 PHARM REV CODE 250: Performed by: NURSE ANESTHETIST, CERTIFIED REGISTERED

## 2023-07-05 PROCEDURE — D9220A PRA ANESTHESIA: ICD-10-PCS | Mod: ANES,,, | Performed by: SPECIALIST

## 2023-07-05 PROCEDURE — 86900 BLOOD TYPING SEROLOGIC ABO: CPT | Performed by: SURGERY

## 2023-07-05 PROCEDURE — 94799 UNLISTED PULMONARY SVC/PX: CPT

## 2023-07-05 PROCEDURE — 71000033 HC RECOVERY, INTIAL HOUR: Performed by: SURGERY

## 2023-07-05 PROCEDURE — 36000710: Performed by: SURGERY

## 2023-07-05 PROCEDURE — 63600175 PHARM REV CODE 636 W HCPCS: Performed by: NURSE ANESTHETIST, CERTIFIED REGISTERED

## 2023-07-05 RX ORDER — SODIUM CHLORIDE, SODIUM LACTATE, POTASSIUM CHLORIDE, CALCIUM CHLORIDE 600; 310; 30; 20 MG/100ML; MG/100ML; MG/100ML; MG/100ML
INJECTION, SOLUTION INTRAVENOUS CONTINUOUS
Status: DISCONTINUED | OUTPATIENT
Start: 2023-07-05 | End: 2023-07-06 | Stop reason: HOSPADM

## 2023-07-05 RX ORDER — GABAPENTIN 300 MG/1
600 CAPSULE ORAL
Status: COMPLETED | OUTPATIENT
Start: 2023-07-05 | End: 2023-07-05

## 2023-07-05 RX ORDER — ONDANSETRON 2 MG/ML
4 INJECTION INTRAMUSCULAR; INTRAVENOUS EVERY 4 HOURS PRN
Status: DISCONTINUED | OUTPATIENT
Start: 2023-07-05 | End: 2023-07-06 | Stop reason: HOSPADM

## 2023-07-05 RX ORDER — OXYCODONE AND ACETAMINOPHEN 5; 325 MG/1; MG/1
2 TABLET ORAL ONCE
Status: DISCONTINUED | OUTPATIENT
Start: 2023-07-05 | End: 2023-07-05

## 2023-07-05 RX ORDER — ENOXAPARIN SODIUM 100 MG/ML
40 INJECTION SUBCUTANEOUS EVERY 24 HOURS
Status: DISCONTINUED | OUTPATIENT
Start: 2023-07-06 | End: 2023-07-06 | Stop reason: HOSPADM

## 2023-07-05 RX ORDER — DEXAMETHASONE SODIUM PHOSPHATE 4 MG/ML
INJECTION, SOLUTION INTRA-ARTICULAR; INTRALESIONAL; INTRAMUSCULAR; INTRAVENOUS; SOFT TISSUE
Status: DISCONTINUED | OUTPATIENT
Start: 2023-07-05 | End: 2023-07-05

## 2023-07-05 RX ORDER — ENOXAPARIN SODIUM 100 MG/ML
40 INJECTION SUBCUTANEOUS ONCE
Status: COMPLETED | OUTPATIENT
Start: 2023-07-05 | End: 2023-07-05

## 2023-07-05 RX ORDER — ACETAMINOPHEN 500 MG
1000 TABLET ORAL
Status: COMPLETED | OUTPATIENT
Start: 2023-07-05 | End: 2023-07-05

## 2023-07-05 RX ORDER — HYDROMORPHONE HYDROCHLORIDE 1 MG/ML
0.5 INJECTION, SOLUTION INTRAMUSCULAR; INTRAVENOUS; SUBCUTANEOUS EVERY 5 MIN PRN
Status: DISCONTINUED | OUTPATIENT
Start: 2023-07-05 | End: 2023-07-05

## 2023-07-05 RX ORDER — SUCCINYLCHOLINE CHLORIDE 20 MG/ML
INJECTION INTRAMUSCULAR; INTRAVENOUS
Status: DISCONTINUED | OUTPATIENT
Start: 2023-07-05 | End: 2023-07-05

## 2023-07-05 RX ORDER — MIDAZOLAM HYDROCHLORIDE 1 MG/ML
2 INJECTION INTRAMUSCULAR; INTRAVENOUS ONCE AS NEEDED
Status: COMPLETED | OUTPATIENT
Start: 2023-07-05 | End: 2023-07-05

## 2023-07-05 RX ORDER — KETOROLAC TROMETHAMINE 30 MG/ML
INJECTION, SOLUTION INTRAMUSCULAR; INTRAVENOUS
Status: DISCONTINUED | OUTPATIENT
Start: 2023-07-05 | End: 2023-07-05

## 2023-07-05 RX ORDER — PROCHLORPERAZINE EDISYLATE 5 MG/ML
5 INJECTION INTRAMUSCULAR; INTRAVENOUS ONCE AS NEEDED
Status: DISCONTINUED | OUTPATIENT
Start: 2023-07-05 | End: 2023-07-05

## 2023-07-05 RX ORDER — DIPHENHYDRAMINE HYDROCHLORIDE 50 MG/ML
25 INJECTION INTRAMUSCULAR; INTRAVENOUS ONCE AS NEEDED
Status: DISCONTINUED | OUTPATIENT
Start: 2023-07-05 | End: 2023-07-05

## 2023-07-05 RX ORDER — TRAMADOL HYDROCHLORIDE 50 MG/1
100 TABLET ORAL EVERY 6 HOURS PRN
Status: DISCONTINUED | OUTPATIENT
Start: 2023-07-06 | End: 2023-07-06 | Stop reason: HOSPADM

## 2023-07-05 RX ORDER — CLONIDINE 0.1 MG/24H
1 PATCH, EXTENDED RELEASE TRANSDERMAL ONCE AS NEEDED
Status: DISCONTINUED | OUTPATIENT
Start: 2023-07-05 | End: 2023-07-06 | Stop reason: HOSPADM

## 2023-07-05 RX ORDER — HYDROMORPHONE HYDROCHLORIDE 1 MG/ML
INJECTION, SOLUTION INTRAMUSCULAR; INTRAVENOUS; SUBCUTANEOUS
Status: DISPENSED
Start: 2023-07-05 | End: 2023-07-05

## 2023-07-05 RX ORDER — PROPOFOL 10 MG/ML
VIAL (ML) INTRAVENOUS
Status: DISCONTINUED | OUTPATIENT
Start: 2023-07-05 | End: 2023-07-05

## 2023-07-05 RX ORDER — PROCHLORPERAZINE EDISYLATE 5 MG/ML
INJECTION INTRAMUSCULAR; INTRAVENOUS
Status: DISPENSED
Start: 2023-07-05 | End: 2023-07-05

## 2023-07-05 RX ORDER — PROMETHAZINE HYDROCHLORIDE 25 MG/ML
INJECTION, SOLUTION INTRAMUSCULAR; INTRAVENOUS
Status: DISCONTINUED
Start: 2023-07-05 | End: 2023-07-05 | Stop reason: WASHOUT

## 2023-07-05 RX ORDER — ONDANSETRON 2 MG/ML
INJECTION INTRAMUSCULAR; INTRAVENOUS
Status: COMPLETED
Start: 2023-07-05 | End: 2023-07-05

## 2023-07-05 RX ORDER — PROCHLORPERAZINE EDISYLATE 5 MG/ML
5 INJECTION INTRAMUSCULAR; INTRAVENOUS EVERY 4 HOURS PRN
Status: DISCONTINUED | OUTPATIENT
Start: 2023-07-05 | End: 2023-07-06 | Stop reason: HOSPADM

## 2023-07-05 RX ORDER — LABETALOL HCL 20 MG/4 ML
10 SYRINGE (ML) INTRAVENOUS
Status: DISCONTINUED | OUTPATIENT
Start: 2023-07-05 | End: 2023-07-06 | Stop reason: HOSPADM

## 2023-07-05 RX ORDER — BUPIVACAINE HYDROCHLORIDE 2.5 MG/ML
INJECTION, SOLUTION EPIDURAL; INFILTRATION; INTRACAUDAL
Status: DISCONTINUED | OUTPATIENT
Start: 2023-07-05 | End: 2023-07-05 | Stop reason: HOSPADM

## 2023-07-05 RX ORDER — ROCURONIUM BROMIDE 10 MG/ML
INJECTION, SOLUTION INTRAVENOUS
Status: DISCONTINUED | OUTPATIENT
Start: 2023-07-05 | End: 2023-07-05

## 2023-07-05 RX ORDER — CEFAZOLIN SODIUM 1 G/3ML
2 INJECTION, POWDER, FOR SOLUTION INTRAMUSCULAR; INTRAVENOUS
Status: COMPLETED | OUTPATIENT
Start: 2023-07-05 | End: 2023-07-05

## 2023-07-05 RX ORDER — SODIUM CHLORIDE, SODIUM LACTATE, POTASSIUM CHLORIDE, CALCIUM CHLORIDE 600; 310; 30; 20 MG/100ML; MG/100ML; MG/100ML; MG/100ML
INJECTION, SOLUTION INTRAVENOUS CONTINUOUS
Status: DISCONTINUED | OUTPATIENT
Start: 2023-07-05 | End: 2023-07-05

## 2023-07-05 RX ORDER — MEPERIDINE HYDROCHLORIDE 25 MG/ML
12.5 INJECTION INTRAMUSCULAR; INTRAVENOUS; SUBCUTANEOUS ONCE
Status: DISCONTINUED | OUTPATIENT
Start: 2023-07-05 | End: 2023-07-05

## 2023-07-05 RX ORDER — PHENYLEPHRINE HYDROCHLORIDE 10 MG/ML
INJECTION INTRAVENOUS
Status: DISCONTINUED | OUTPATIENT
Start: 2023-07-05 | End: 2023-07-05

## 2023-07-05 RX ORDER — ONDANSETRON 2 MG/ML
4 INJECTION INTRAMUSCULAR; INTRAVENOUS ONCE
Status: COMPLETED | OUTPATIENT
Start: 2023-07-05 | End: 2023-07-05

## 2023-07-05 RX ORDER — CEFAZOLIN SODIUM 2 G/50ML
2 SOLUTION INTRAVENOUS
Status: COMPLETED | OUTPATIENT
Start: 2023-07-05 | End: 2023-07-06

## 2023-07-05 RX ORDER — BUPIVACAINE HYDROCHLORIDE 2.5 MG/ML
INJECTION, SOLUTION EPIDURAL; INFILTRATION; INTRACAUDAL
Status: DISPENSED
Start: 2023-07-05 | End: 2023-07-05

## 2023-07-05 RX ORDER — LORAZEPAM 2 MG/ML
0.5 INJECTION INTRAMUSCULAR EVERY 8 HOURS PRN
Status: DISCONTINUED | OUTPATIENT
Start: 2023-07-05 | End: 2023-07-06 | Stop reason: HOSPADM

## 2023-07-05 RX ORDER — PANTOPRAZOLE SODIUM 40 MG/10ML
40 INJECTION, POWDER, LYOPHILIZED, FOR SOLUTION INTRAVENOUS EVERY 24 HOURS
Status: DISCONTINUED | OUTPATIENT
Start: 2023-07-05 | End: 2023-07-06 | Stop reason: HOSPADM

## 2023-07-05 RX ORDER — ACETAMINOPHEN 500 MG
1000 TABLET ORAL EVERY 8 HOURS
Status: DISCONTINUED | OUTPATIENT
Start: 2023-07-06 | End: 2023-07-06 | Stop reason: HOSPADM

## 2023-07-05 RX ORDER — CELECOXIB 200 MG/1
200 CAPSULE ORAL
Status: COMPLETED | OUTPATIENT
Start: 2023-07-05 | End: 2023-07-05

## 2023-07-05 RX ORDER — KETOROLAC TROMETHAMINE 30 MG/ML
30 INJECTION, SOLUTION INTRAMUSCULAR; INTRAVENOUS EVERY 6 HOURS
Status: DISCONTINUED | OUTPATIENT
Start: 2023-07-05 | End: 2023-07-06 | Stop reason: HOSPADM

## 2023-07-05 RX ORDER — MIDAZOLAM HYDROCHLORIDE 1 MG/ML
INJECTION INTRAMUSCULAR; INTRAVENOUS
Status: DISPENSED
Start: 2023-07-05 | End: 2023-07-05

## 2023-07-05 RX ORDER — ONDANSETRON 4 MG/1
4 TABLET, ORALLY DISINTEGRATING ORAL EVERY 6 HOURS PRN
Status: DISCONTINUED | OUTPATIENT
Start: 2023-07-05 | End: 2023-07-06 | Stop reason: HOSPADM

## 2023-07-05 RX ORDER — LIDOCAINE HYDROCHLORIDE 20 MG/ML
INJECTION, SOLUTION EPIDURAL; INFILTRATION; INTRACAUDAL; PERINEURAL
Status: DISCONTINUED | OUTPATIENT
Start: 2023-07-05 | End: 2023-07-05

## 2023-07-05 RX ORDER — SODIUM CHLORIDE, SODIUM LACTATE, POTASSIUM CHLORIDE, CALCIUM CHLORIDE 600; 310; 30; 20 MG/100ML; MG/100ML; MG/100ML; MG/100ML
INJECTION, SOLUTION INTRAVENOUS CONTINUOUS
Status: DISCONTINUED | OUTPATIENT
Start: 2023-07-05 | End: 2023-07-06

## 2023-07-05 RX ORDER — ONDANSETRON 4 MG/1
4 TABLET, ORALLY DISINTEGRATING ORAL
Status: COMPLETED | OUTPATIENT
Start: 2023-07-05 | End: 2023-07-05

## 2023-07-05 RX ORDER — ONDANSETRON HYDROCHLORIDE 2 MG/ML
INJECTION, SOLUTION INTRAMUSCULAR; INTRAVENOUS
Status: DISCONTINUED | OUTPATIENT
Start: 2023-07-05 | End: 2023-07-05

## 2023-07-05 RX ORDER — FENTANYL CITRATE 50 UG/ML
INJECTION, SOLUTION INTRAMUSCULAR; INTRAVENOUS
Status: DISCONTINUED | OUTPATIENT
Start: 2023-07-05 | End: 2023-07-05

## 2023-07-05 RX ORDER — HYDROMORPHONE HYDROCHLORIDE 1 MG/ML
0.2 INJECTION, SOLUTION INTRAMUSCULAR; INTRAVENOUS; SUBCUTANEOUS EVERY 5 MIN PRN
Status: DISCONTINUED | OUTPATIENT
Start: 2023-07-05 | End: 2023-07-05

## 2023-07-05 RX ADMIN — FENTANYL CITRATE 25 MCG: 50 INJECTION, SOLUTION INTRAMUSCULAR; INTRAVENOUS at 10:07

## 2023-07-05 RX ADMIN — PHENYLEPHRINE HYDROCHLORIDE 100 MCG: 10 INJECTION INTRAVENOUS at 10:07

## 2023-07-05 RX ADMIN — PROPOFOL 50 MG: 10 INJECTION, EMULSION INTRAVENOUS at 09:07

## 2023-07-05 RX ADMIN — ROCURONIUM BROMIDE 5 MG: 10 INJECTION INTRAVENOUS at 09:07

## 2023-07-05 RX ADMIN — SODIUM CHLORIDE, POTASSIUM CHLORIDE, SODIUM LACTATE AND CALCIUM CHLORIDE: 600; 310; 30; 20 INJECTION, SOLUTION INTRAVENOUS at 09:07

## 2023-07-05 RX ADMIN — CEFAZOLIN 3 G: 330 INJECTION, POWDER, FOR SOLUTION INTRAMUSCULAR; INTRAVENOUS at 09:07

## 2023-07-05 RX ADMIN — CELECOXIB 200 MG: 200 CAPSULE ORAL at 06:07

## 2023-07-05 RX ADMIN — ACETAMINOPHEN 1000 MG: 500 TABLET, FILM COATED ORAL at 06:07

## 2023-07-05 RX ADMIN — ONDANSETRON 4 MG: 2 INJECTION INTRAMUSCULAR; INTRAVENOUS at 06:07

## 2023-07-05 RX ADMIN — KETOROLAC TROMETHAMINE 30 MG: 30 INJECTION, SOLUTION INTRAMUSCULAR at 09:07

## 2023-07-05 RX ADMIN — ROCURONIUM BROMIDE 20 MG: 10 INJECTION INTRAVENOUS at 10:07

## 2023-07-05 RX ADMIN — ONDANSETRON 4 MG: 2 INJECTION INTRAMUSCULAR; INTRAVENOUS at 04:07

## 2023-07-05 RX ADMIN — LIDOCAINE HYDROCHLORIDE 100 MG: 20 INJECTION, SOLUTION EPIDURAL; INFILTRATION; INTRACAUDAL; PERINEURAL at 09:07

## 2023-07-05 RX ADMIN — SODIUM CHLORIDE, POTASSIUM CHLORIDE, SODIUM LACTATE AND CALCIUM CHLORIDE: 600; 310; 30; 20 INJECTION, SOLUTION INTRAVENOUS at 08:07

## 2023-07-05 RX ADMIN — ROCURONIUM BROMIDE 45 MG: 10 INJECTION INTRAVENOUS at 09:07

## 2023-07-05 RX ADMIN — HYDROMORPHONE HYDROCHLORIDE 0.5 MG: 0.5 INJECTION, SOLUTION INTRAMUSCULAR; INTRAVENOUS; SUBCUTANEOUS at 11:07

## 2023-07-05 RX ADMIN — SUGAMMADEX 200 MG: 100 INJECTION, SOLUTION INTRAVENOUS at 10:07

## 2023-07-05 RX ADMIN — KETOROLAC TROMETHAMINE 30 MG: 30 INJECTION, SOLUTION INTRAMUSCULAR; INTRAVENOUS at 06:07

## 2023-07-05 RX ADMIN — CEFAZOLIN SODIUM 2 G: 2 SOLUTION INTRAVENOUS at 05:07

## 2023-07-05 RX ADMIN — LIDOCAINE HYDROCHLORIDE 100 MG: 20 INJECTION, SOLUTION EPIDURAL; INFILTRATION; INTRACAUDAL; PERINEURAL at 11:07

## 2023-07-05 RX ADMIN — SUCCINYLCHOLINE CHLORIDE 160 MG: 20 INJECTION, SOLUTION INTRAMUSCULAR; INTRAVENOUS at 09:07

## 2023-07-05 RX ADMIN — GABAPENTIN 600 MG: 300 CAPSULE ORAL at 06:07

## 2023-07-05 RX ADMIN — SODIUM CHLORIDE, POTASSIUM CHLORIDE, SODIUM LACTATE AND CALCIUM CHLORIDE: 600; 310; 30; 20 INJECTION, SOLUTION INTRAVENOUS at 11:07

## 2023-07-05 RX ADMIN — ONDANSETRON 4 MG: 2 INJECTION INTRAMUSCULAR; INTRAVENOUS at 11:07

## 2023-07-05 RX ADMIN — ONDANSETRON 4 MG: 4 TABLET, ORALLY DISINTEGRATING ORAL at 06:07

## 2023-07-05 RX ADMIN — KETOROLAC TROMETHAMINE 30 MG: 30 INJECTION, SOLUTION INTRAMUSCULAR; INTRAVENOUS at 02:07

## 2023-07-05 RX ADMIN — FENTANYL CITRATE 50 MCG: 50 INJECTION, SOLUTION INTRAMUSCULAR; INTRAVENOUS at 09:07

## 2023-07-05 RX ADMIN — PROPOFOL 150 MG: 10 INJECTION, EMULSION INTRAVENOUS at 09:07

## 2023-07-05 RX ADMIN — ENOXAPARIN SODIUM 40 MG: 40 INJECTION SUBCUTANEOUS at 06:07

## 2023-07-05 RX ADMIN — DEXAMETHASONE SODIUM PHOSPHATE 8 MG: 4 INJECTION, SOLUTION INTRA-ARTICULAR; INTRALESIONAL; INTRAMUSCULAR; INTRAVENOUS; SOFT TISSUE at 09:07

## 2023-07-05 RX ADMIN — MIDAZOLAM HYDROCHLORIDE 2 MG: 1 INJECTION, SOLUTION INTRAMUSCULAR; INTRAVENOUS at 08:07

## 2023-07-05 RX ADMIN — ONDANSETRON 4 MG: 2 INJECTION INTRAMUSCULAR; INTRAVENOUS at 10:07

## 2023-07-05 RX ADMIN — SODIUM CHLORIDE, POTASSIUM CHLORIDE, SODIUM LACTATE AND CALCIUM CHLORIDE: 600; 310; 30; 20 INJECTION, SOLUTION INTRAVENOUS at 12:07

## 2023-07-05 NOTE — PLAN OF CARE
07/05/23 1406   Discharge Assessment   Assessment Type Discharge Planning Assessment   Confirmed/corrected address, phone number and insurance Yes   Confirmed Demographics Correct on Facesheet   Source of Information other (see comments)  (Friend)   Does patient/caregiver understand observation status   (Inpatient)   Communicated GILSON with patient/caregiver Date not available/Unable to determine   Reason For Admission Morbid obesity, gastric sleeve surgery   People in Home spouse;child(devante), adult;child(devante), dependent   Facility Arrived From: Home   Do you expect to return to your current living situation? Yes   Do you have help at home or someone to help you manage your care at home? Yes   Who are your caregiver(s) and their phone number(s)? Lexus Ricketts, friend, P: [phone], Ayaz Cooley, , P: [phone]   Prior to hospitilization cognitive status: Alert/Oriented;No Deficits   Current cognitive status: Unable to Assess   Home Layout Able to live on 1st floor   Equipment Currently Used at Home CPAP;glucometer   Readmission within 30 days? No   Patient currently being followed by outpatient case management? No   Do you currently have service(s) that help you manage your care at home? No   Do you take prescription medications? Yes  ([facility] [place])   Do you have prescription coverage? Yes   Coverage [facility]   Do you have any problems affording any of your prescribed medications? No   Is the patient taking medications as prescribed? yes   Who is going to help you get home at discharge? Lexus Ricketts, friend, P: [phone]   How do you get to doctors appointments? car, drives self;family or friend will provide   Are you on dialysis? No   Do you take coumadin? No   Discharge Plan A Home with family   DME Needed Upon Discharge  none   Discharge Plan discussed with: Friend   Name(s) and Number(s) Lexus Ricketts friend, P: [phone]   Transition of Care Barriers None   Physical Activity    On average, how many days per week do you engage in moderate to strenuous exercise (like a brisk walk)? 3 days   On average, how many minutes do you engage in exercise at this level? 20 min   Financial Resource Strain   How hard is it for you to pay for the very basics like food, housing, medical care, and heating? Not very   Housing Stability   In the last 12 months, was there a time when you were not able to pay the mortgage or rent on time? N   In the last 12 months, how many places have you lived? 1   In the last 12 months, was there a time when you did not have a steady place to sleep or slept in a shelter (including now)? N   Transportation Needs   In the past 12 months, has lack of transportation kept you from medical appointments or from getting medications? no   In the past 12 months, has lack of transportation kept you from meetings, work, or from getting things needed for daily living? No   Food Insecurity   Within the past 12 months, you worried that your food would run out before you got the money to buy more. Never true   Within the past 12 months, the food you bought just didn't last and you didn't have money to get more. Never true   Stress   Do you feel stress - tense, restless, nervous, or anxious, or unable to sleep at night because your mind is troubled all the time - these days? Only a littl   Social Connections   In a typical week, how many times do you talk on the phone with family, friends, or neighbors? More than 3   How often do you get together with friends or relatives? More than 3   How often do you attend Restorationism or Cheondoism services? More than 4   Do you belong to any clubs or organizations such as Restorationism groups, unions, fraternal or athletic groups, or school groups? No   How often do you attend meetings of the clubs or organizations you belong to? Never   Are you , , , , never , or living with a partner?    Alcohol Use   Q1: How often do  you have a drink containing alcohol? Never   Q2: How many drinks containing alcohol do you have on a typical day when you are drinking? None   Q3: How often do you have six or more drinks on one occasion? Never   OTHER   Name(s) of People in Home Ayazemily Cooley, , P: [phone]; children ages 7 and 19

## 2023-07-05 NOTE — H&P
[facility] Service  H&P NOTE  Date: 7/5/2023    CC:   Outpatient sleeve gastrectomy     HPI:   Yuri Mijares is a 37 y.o. female who presents to [facility] today as an outpatient for scheduled gastric sleeve. She has completed all of her preoperative education with dieticians and is ready to proceed. Denies n/v, f/c, chest pain, SOB, abdominal pain.    ROS:  Negative unless specified in HPI    PMH:   Past Medical History:   Diagnosis Date    Diabetes mellitus     Hypertension     Morbid obesity with BMI of 40.0-44.9, adult 04/24/2017    Nontoxic simple goiter 09/15/2016    ALXE (obstructive sleep apnea)     Panic attacks     Thyroid nodule        PSH:   Past Surgical History:   Procedure Laterality Date    ESOPHAGOGASTRODUODENOSCOPY N/A 1/5/2023    Procedure: EGD w/ ROBERT;  Surgeon: Vineet Corrigan Jr., MD;  Location: [facility] OR;  Service: General;  Laterality: N/A;    PENETRATING KERATOPLASTY Right 9/22/2022    Procedure: KERATOPLASTY, PENETRATING;  Surgeon: Cristal Beckett MD;  Location: [facility], [place], operated by [facility] OR;  Service: Ophthalmology;  Laterality: Right;    s/p cross linking left eye      TOTAL THYROIDECTOMY  06/15/2017       FamHx:   Family History   Problem Relation Age of Onset    No Known Problems Mother     No Known Problems Father     Blindness Neg Hx     Glaucoma Neg Hx     Macular degeneration Neg Hx     Retinal detachment Neg Hx        SocHx:  Social History     Socioeconomic History    Marital status: Single    Number of children: 3    Years of education: 2 year college   Occupational History    Occupation: homemaker   Tobacco Use    Smoking status: Never    Smokeless tobacco: Never   Substance and Sexual Activity    Alcohol use: No    Drug use: No    Sexual activity: Yes       Allergies:   Review of patient's allergies indicates:   Allergen Reactions    Amoxicillin Other (See Comments)     Yeast infections    Iodine Rash     Other reaction(s): Unknown       Medications:  No current facility-administered  medications on file prior to encounter.     Current Outpatient Medications on File Prior to Encounter   Medication Sig Dispense Refill    EFFEXOR  mg Cp24 Take 150 mg by mouth once daily.      JANUVIA 100 mg Tab Take 100 mg by mouth once daily.      pioglitazone (ACTOS) 30 MG tablet Take 30 mg by mouth once daily.      UNITHROID 200 mcg tablet Take 200 mcg by mouth every morning.      butalbital-acetaminophen-caff -40 mg Cap Take 1 capsule by mouth every 4 (four) hours as needed.      insulin glargine (LANTUS) 100 unit/mL injection Inject into the skin.      LORazepam (ATIVAN) 1 MG tablet Take 0.5 mg by mouth every 6 (six) hours as needed.       metFORMIN (GLUCOPHAGE) 500 MG tablet Take 500 mg by mouth.      ofloxacin (OCUFLOX) 0.3 % ophthalmic solution       OZEMPIC 0.25 mg or 0.5 mg(2 mg/1.5 mL) pen injector Inject into the skin.      prednisoLONE acetate (PRED FORTE) 1 % DrpS Place into both eyes.      promethazine (PHENERGAN) 25 MG tablet Take 12.5 mg by mouth every 6 (six) hours as needed.      sumatriptan (IMITREX) 100 MG tablet Take 100 mg by mouth 2 (two) times daily.           Objective:    VITAL SIGNS: 24 HR MIN & MAX LAST    Temp  Min: 98.6 °F (37 °C)  Max: 98.6 °F (37 °C)  98.6 °F (37 °C)        BP  Min: 104/69  Max: 104/69  104/69     Pulse  Min: 103  Max: 103  103     No data recorded       SpO2  Min: 97 %  Max: 97 %  97 %      HT:    WT: 128.2 kg (282 lb 9.6 oz)  BMI:         Physical Exam:  Gen: NAD, AAOx3, obese  Eyes: EOMI, sclera clear, lids normal  CV: extremities wwp, regular rate, palpable radials  Pulm: nonlabored, no increased wob, equal chest rise b/l   Abd: s/nt/nd   MSK: no deformity, joint ROM intact across all extremities  Skin: No rashes, wounds, or infections noted  Psych: Normal mood/affect. Judgement and insight intact.    Results  No pre-op labs drawn    A/P:   37 y.o. female who presents as an outpatient for scheduled sleeve gastrectomy.    - To OR 4 for sleeve  gastrectomy    Jose Juan Panchal MD  [facility] General Surgery PGY-1

## 2023-07-05 NOTE — OP NOTE
Patient:  Yuri Mijares        :  1985           DATE OF SURGERY:     2023            SURGEON:  Vineet Corrigan MD         ASSISTANT:  Radha Pizarro MD (Resident)          PREOPERATIVE DIAGNOSIS:  Morbid obesity.           POSTOPERATIVE DIAGNOSIS:  Same.           OPERATIONS:  Laparoscopic vertical sleeve gastrectomy.            Anesthesia:  General endotracheal anesthesia.      Estimated blood loss:  Less than 50 cc.      Blood administered:  None.      Lap and instrument counts correct x 2 at the end of the case.     Specimen: Lateral Remnant Stomach           INDICATIONS/SIGNIFICANT HISTORY:  The patient is a 37 y.o. year old female who was unable to achieve sustainable weight loss and improve the co-morbid conditions with self dieting and exercise.  The patient attended a weight loss seminar and was interested in laparoscopic vertical sleeve gastrectomy.  Once the patient was cleared medically and attended pre-operative classes, the risks and benefits of surgery were discussed with the patient.  The patient voiced understanding of risks and benefits and elected to proceed with surgery.           PROCEDURE IN DETAIL:  Once informed consents were obtained, the patient was taken to the operating room and placed supine on the operating table.  After general endotracheal anesthesia was induced, the abdomen was prepped and draped in a standard surgical fashion.  A left paramedian incision was made with a scalpel, upon which the 11 mm Visiport trocar was used to enter the abdominal cavity.  A pneumoperitoneum was then created with insufflation.  After adequate insufflation was obtained, two additional trocar ports were placed in the right upper quadrant (12mm and 15mm), followed by two additional 5mm working trocar ports placed in the left upper quadrant.  A Johnnie liver retractor was placed subxiphoid for retraction of the liver superiorly.  Attention was then redirected to the gastric  hiatus.  No hiatal hernia was appreciated.  The short gastric vessels were then taken down starting 6 cm proximal to the pylorus, up to the left india of the diaphragm.  Once this was performed, a 34 Croatian blunt-tipped bougie was then placed in the distal portion of the stomach.  Using this as a guide, the stomach was stapled up to the left india of the diaphragm using 3 green loads then blue loads.  Particular note was made not to narrow the angular incisura.  A small portion of the fundus was left purposefully near the GE junction during the stapling procedure.  The staple line was then visualized and appeared secure and intact with no active bleeding noted.  The remnant of the stomach was removed through the right paramedian trocar port and passed off the table as specimen.  Vistaseal was then placed along the staple line for added hemostasis.  The liver retractor was then removed.  The extraction port site abdominal fascia was closed with 0 Vicryl suture in interrupted fashion using a suture passer.  The pneumoperitoneum was then evacuated and all ports were removed with no active bleeding noted.  The skin was then closed with a 4-0 Vicryl suture in interrupted fashion.  The skin was cleansed and dried, and a dry sterile dressing was placed on the wound.  Lap and instrument counts were correct x 2 at the end of the case.  The patient tolerated the procedure well and was transported to the recovery room in good condition.

## 2023-07-05 NOTE — ANESTHESIA PROCEDURE NOTES
Intubation    Date/Time: 7/5/2023 9:39 AM  Performed by: Te Mckinney CRNA  Authorized by: Magdalene Sheehan MD     Intubation:     Induction:  Intravenous    Intubated:  Postinduction    Mask Ventilation:  Easy with oral airway    Attempts:  1    Attempted By:  Student    Method of Intubation:  Direct    Blade:  Ramos 2    Laryngeal View Grade: Grade I - full view of cords      Difficult Airway Encountered?: No      Complications:  None    Airway Device:  Oral endotracheal tube    Airway Device Size:  7.5    Style/Cuff Inflation:  Cuffed (inflated to minimal occlusive pressure)    Inflation Amount (mL):  6    Tube secured:  22    Placement Verified By:  Capnometry    Complicating Factors:  Obesity    Findings Post-Intubation:  BS equal bilateral and atraumatic/condition of teeth unchanged

## 2023-07-05 NOTE — ANESTHESIA POSTPROCEDURE EVALUATION
Anesthesia Post Evaluation    Patient: Yuri Mijares    Procedure(s) Performed: Procedure(s) (LRB):  GASTRECTOMY, SLEEVE, LAPAROSCOPIC (N/A)    Final Anesthesia Type: general      Patient location during evaluation: PACU  Patient participation: Yes- Able to Participate  Level of consciousness: awake and responds to stimulation  Post-procedure vital signs: reviewed and stable  Pain management: adequate  Airway patency: patent    PONV status at discharge: No PONV  Anesthetic complications: no      Cardiovascular status: blood pressure returned to baseline  Respiratory status: unassisted  Hydration status: euvolemic  Follow-up not needed.          Vitals Value Taken Time   /83 07/05/23 1155   Temp 36.6 °C (97.9 °F) 07/05/23 1155   Pulse 90 07/05/23 1235   Resp 17 07/05/23 1155   SpO2 92 % 07/05/23 1258         Event Time   Out of Recovery 12:05:00         Pain/Jamia Score: Pain Rating Prior to Med Admin: 8 (7/5/2023 11:47 AM)  Pain Rating Post Med Admin: 4 (7/5/2023 12:17 PM)  Jamia Score: 9 (7/5/2023 11:43 AM)

## 2023-07-05 NOTE — TRANSFER OF CARE
Anesthesia Transfer of Care Note    Patient: Yuri Mijares    Procedure(s) Performed: Procedure(s) (LRB):  GASTRECTOMY, SLEEVE, LAPAROSCOPIC (N/A)    Patient location: PACU    Anesthesia Type: general    Transport from OR: Transported from OR on room air with adequate spontaneous ventilation    Post pain: adequate analgesia    Post vital signs: stable    Level of consciousness: awake    Nausea/Vomiting: no nausea/vomiting    Complications: none    Transfer of care protocol was followed      Last vitals:   Visit Vitals  /76   Pulse 94   Temp 36.3 °C (97.3 °F) (Temporal)   Resp 20   Wt 128.2 kg (282 lb 9.6 oz)   SpO2 100%   Breastfeeding No   BMI 52.53 kg/m²

## 2023-07-06 ENCOUNTER — TELEPHONE (OUTPATIENT)
Dept: BARIATRICS | Facility: HOSPITAL | Age: 38
End: 2023-07-06
Payer: MEDICAID

## 2023-07-06 VITALS
RESPIRATION RATE: 17 BRPM | HEART RATE: 84 BPM | DIASTOLIC BLOOD PRESSURE: 83 MMHG | WEIGHT: 282 LBS | HEIGHT: 60 IN | TEMPERATURE: 99 F | SYSTOLIC BLOOD PRESSURE: 124 MMHG | BODY MASS INDEX: 55.36 KG/M2 | OXYGEN SATURATION: 98 %

## 2023-07-06 LAB
ANION GAP SERPL CALC-SCNC: 7 MEQ/L
BASOPHILS # BLD AUTO: 0.02 X10(3)/MCL
BASOPHILS NFR BLD AUTO: 0.1 %
BUN SERPL-MCNC: 9 MG/DL (ref 7–18.7)
CALCIUM SERPL-MCNC: 8.8 MG/DL (ref 8.4–10.2)
CHLORIDE SERPL-SCNC: 106 MMOL/L (ref 98–107)
CO2 SERPL-SCNC: 23 MMOL/L (ref 22–29)
CREAT SERPL-MCNC: 1.04 MG/DL (ref 0.55–1.02)
CREAT/UREA NIT SERPL: 9
EOSINOPHIL # BLD AUTO: 0 X10(3)/MCL (ref 0–0.9)
EOSINOPHIL NFR BLD AUTO: 0 %
ERYTHROCYTE [DISTWIDTH] IN BLOOD BY AUTOMATED COUNT: 14.2 % (ref 11.5–17)
ESTROGEN SERPL-MCNC: NORMAL PG/ML
GFR SERPLBLD CREATININE-BSD FMLA CKD-EPI: >60 MLS/MIN/1.73/M2
GLUCOSE SERPL-MCNC: 182 MG/DL (ref 74–100)
HCT VFR BLD AUTO: 34.7 % (ref 37–47)
HGB BLD-MCNC: 11.5 G/DL (ref 12–16)
IMM GRANULOCYTES # BLD AUTO: 0.1 X10(3)/MCL (ref 0–0.04)
IMM GRANULOCYTES NFR BLD AUTO: 0.7 %
INSULIN SERPL-ACNC: NORMAL U[IU]/ML
LAB AP CLINICAL INFORMATION: NORMAL
LAB AP GROSS DESCRIPTION: NORMAL
LAB AP REPORT FOOTNOTES: NORMAL
LYMPHOCYTES # BLD AUTO: 1.77 X10(3)/MCL (ref 0.6–4.6)
LYMPHOCYTES NFR BLD AUTO: 12 %
MCH RBC QN AUTO: 30.4 PG (ref 27–31)
MCHC RBC AUTO-ENTMCNC: 33.1 G/DL (ref 33–36)
MCV RBC AUTO: 91.8 FL (ref 80–94)
MONOCYTES # BLD AUTO: 0.83 X10(3)/MCL (ref 0.1–1.3)
MONOCYTES NFR BLD AUTO: 5.6 %
NEUTROPHILS # BLD AUTO: 12.01 X10(3)/MCL (ref 2.1–9.2)
NEUTROPHILS NFR BLD AUTO: 81.6 %
NRBC BLD AUTO-RTO: 0 %
PLATELET # BLD AUTO: 238 X10(3)/MCL (ref 130–400)
PMV BLD AUTO: 11 FL (ref 7.4–10.4)
POCT GLUCOSE: 138 MG/DL (ref 70–110)
POCT GLUCOSE: 138 MG/DL (ref 70–110)
POCT GLUCOSE: 160 MG/DL (ref 70–110)
POTASSIUM SERPL-SCNC: 4.6 MMOL/L (ref 3.5–5.1)
RBC # BLD AUTO: 3.78 X10(6)/MCL (ref 4.2–5.4)
SODIUM SERPL-SCNC: 136 MMOL/L (ref 136–145)
T3RU NFR SERPL: NORMAL %
WBC # SPEC AUTO: 14.73 X10(3)/MCL (ref 4.5–11.5)

## 2023-07-06 PROCEDURE — 85025 COMPLETE CBC W/AUTO DIFF WBC: CPT

## 2023-07-06 PROCEDURE — 80048 BASIC METABOLIC PNL TOTAL CA: CPT

## 2023-07-06 PROCEDURE — C9113 INJ PANTOPRAZOLE SODIUM, VIA: HCPCS

## 2023-07-06 PROCEDURE — 25000003 PHARM REV CODE 250

## 2023-07-06 PROCEDURE — 94761 N-INVAS EAR/PLS OXIMETRY MLT: CPT

## 2023-07-06 PROCEDURE — 63600175 PHARM REV CODE 636 W HCPCS

## 2023-07-06 RX ORDER — TRAMADOL HYDROCHLORIDE 50 MG/1
50 TABLET ORAL EVERY 6 HOURS PRN
Qty: 20 TABLET | Refills: 0 | Status: SHIPPED | OUTPATIENT
Start: 2023-07-06

## 2023-07-06 RX ORDER — PANTOPRAZOLE SODIUM 40 MG/1
40 TABLET, DELAYED RELEASE ORAL DAILY
Qty: 30 TABLET | Refills: 0 | Status: SHIPPED | OUTPATIENT
Start: 2023-07-06 | End: 2024-02-06

## 2023-07-06 RX ORDER — ONDANSETRON 4 MG/1
4 TABLET, ORALLY DISINTEGRATING ORAL EVERY 6 HOURS PRN
Qty: 10 TABLET | Refills: 0 | Status: SHIPPED | OUTPATIENT
Start: 2023-07-06

## 2023-07-06 RX ORDER — ONDANSETRON 4 MG/1
4 TABLET, ORALLY DISINTEGRATING ORAL EVERY 6 HOURS PRN
Qty: 10 TABLET | Refills: 0 | Status: SHIPPED | OUTPATIENT
Start: 2023-07-06 | End: 2023-07-06 | Stop reason: SDUPTHER

## 2023-07-06 RX ORDER — TRAMADOL HYDROCHLORIDE 50 MG/1
50 TABLET ORAL EVERY 6 HOURS PRN
Qty: 20 TABLET | Refills: 0 | Status: SHIPPED | OUTPATIENT
Start: 2023-07-06 | End: 2023-07-06 | Stop reason: SDUPTHER

## 2023-07-06 RX ORDER — PANTOPRAZOLE SODIUM 40 MG/1
40 TABLET, DELAYED RELEASE ORAL DAILY
Qty: 30 TABLET | Refills: 0 | Status: SHIPPED | OUTPATIENT
Start: 2023-07-06 | End: 2023-07-06 | Stop reason: SDUPTHER

## 2023-07-06 RX ADMIN — CEFAZOLIN SODIUM 2 G: 2 SOLUTION INTRAVENOUS at 02:07

## 2023-07-06 RX ADMIN — KETOROLAC TROMETHAMINE 30 MG: 30 INJECTION, SOLUTION INTRAMUSCULAR; INTRAVENOUS at 06:07

## 2023-07-06 RX ADMIN — KETOROLAC TROMETHAMINE 30 MG: 30 INJECTION, SOLUTION INTRAMUSCULAR; INTRAVENOUS at 12:07

## 2023-07-06 RX ADMIN — ACETAMINOPHEN 1000 MG: 500 TABLET, FILM COATED ORAL at 05:07

## 2023-07-06 RX ADMIN — PANTOPRAZOLE SODIUM 40 MG: 40 INJECTION, POWDER, FOR SOLUTION INTRAVENOUS at 08:07

## 2023-07-06 RX ADMIN — ENOXAPARIN SODIUM 40 MG: 40 INJECTION SUBCUTANEOUS at 08:07

## 2023-07-06 RX ADMIN — ACETAMINOPHEN 1000 MG: 500 TABLET, FILM COATED ORAL at 02:07

## 2023-07-06 NOTE — TELEPHONE ENCOUNTER
Phoned patient to review medications post-op. Informed patient per Dr. Corrigan that she will need to hold her diabetes medication until she sees her PCP next week.   Patient verbalized understanding and will check her blood sugar 2-3 times a day. Reviewed high blood sugar reading with patient and she was instructed to call the office if her blood sugar was elevated (190-200) range.

## 2023-07-06 NOTE — DISCHARGE SUMMARY
DISCHARGE DATE:  7/6/2023          ADMISSION DIAGNOSIS:  Morbid obesity.           DISCHARGE DIAGNOSIS:  Morbid obesity.           OPERATION:  Laparoscopic vertical sleeve gastrectomy.           HOSPITAL COURSE:  The patient is a 37 y.o.-year-old female who underwent a laparoscopic vertical sleeve gastrectomy for morbid obesity.  On postoperative day number one, the patient's nausea has started to subside.  She was started on a bariatric clear liquid diet.  When the patient was tolerating the bariatric fluid for diet with good urination, she was discharged to home in good condition.           DISCHARGE MEDICATIONS:  The patient may resume her home medications.   Protonix 40 mg 1 po daily, and anti medics as ordered.           DISCHARGE INSTRUCTIONS:  Activity, no heavy lifting greater than 20 lbs for six weeks.  Wounds, patient may shower, no tub baths for two weeks.  Patient is to follow up with Dr. Vineet Corrigan in two weeks.

## 2023-07-06 NOTE — CONSULTS
[facility] Telemetry  Adult Nutrition  Consult Note    SUMMARY     Recommendations    Recommendation/Intervention: Pt to continue nutritional monitoring and education in bariatric clinic per protocol.  Goals: Understanding of dietary guidelines and tolerance to diet.  Nutrition Goal Status: goal met    Assessment and Plan    No new Assessment & Plan notes have been filed under this hospital service since the last note was generated.  Service: Nutrition       Malnutrition Assessment                                       Reason for Assessment    Reason For Assessment: consult  Diagnosis: other (see comments) (s/p VSG)  Relevant Medical History: morbid obesity, GERD, PCOS, ALEX, DM  Interdisciplinary Rounds: attended  General Information Comments: Pt was able to recite with ease the dietary protocols for home discharge.  Nutrition Discharge Planning: Pt to remain on bariatric clear liquids through discharge and progress home diet per protocol.    Nutrition Risk Screen    Nutrition Risk Screen: no indicators present    Nutrition/Diet History    Spiritual, Cultural Beliefs, Yarsani Practices, Values that Affect Care: no  Factors Affecting Nutritional Intake: early satiety, clear liquid diet, decreased appetite (as expected)    Anthropometrics    Temp: 98.8 °F (37.1 °C)  Height Method: Stated  Height: 5' (152.4 cm)  Height (inches): 60 in  Weight Method: Stated  Weight: 127.9 kg (282 lb)  Weight (lb): 282 lb  Ideal Body Weight (IBW), Female: 100 lb  % Ideal Body Weight, Female (lb): 282 %  BMI (Calculated): 55.1  BMI Grade: greater than 40 - morbid obesity  Usual Body Weight (UBW), k kg  % Usual Body Weight: 99.37  % Weight Change From Usual Weight: -0.84 %       Lab/Procedures/Meds         Physical Findings/Assessment         Estimated/Assessed Needs    Weight Used For Calorie Calculations: 57 kg (125 lb 10.6 oz)  Energy Calorie Requirements (kcal): 4086-3657 kcals/d  Energy Need  Method: Kcal/kg  Protein Requirements: 57-64g/d  Weight Used For Protein Calculations: 57 kg (125 lb 10.6 oz)  Fluid Requirements (mL): 2558 ml/d  Estimated Fluid Requirement Method: other (see comments) (20 ml/kg actual BW/d)  RDA Method (mL): 1140         Nutrition Prescription Ordered    Current Diet Order: Bariatric clear liquids  Nutrition Order Comments: Pt to remain on bariatric clear liquids through discharge and progress home diet per protocol.    Evaluation of Received Nutrient/Fluid Intake    Energy Calories Required: not meeting needs (as expected)  Protein Required: not meeting needs (as expected)  Fluid Required: meeting needs  Tolerance: tolerating  % Intake of Estimated Energy Needs: 0 - 25 %  % Meal Intake: 0 - 25 %    Nutrition Risk    Level of Risk/Frequency of Follow-up: low       Monitor and Evaluation    Food and Nutrient Intake: energy intake, food and beverage intake  Food and Nutrient Adminstration: diet order  Knowledge/Beliefs/Attitudes: food and nutrition knowledge/skill  Anthropometric Measurements: weight, weight change, body mass index       Nutrition Follow-Up    RD Follow-up?: Yes (2w bariatric follow up)

## 2023-07-06 NOTE — PROGRESS NOTES
[facility] General Surgery Progress Note    Yuri Mijares   [id number]     Subjective  37 year old female with PMHx of morbid obesity (BMI 55.34 kg/m2) s/p laparoscopic vertical sleeve gastrectomy.   POD#1: Pt slept well overnight, reports that her pain has mostly subsided, and is still on an NPO diet.  Pt is voiding and able to pass flatus, but has not yet had a bowel movement. Pt has been able to ambulate without excessive pain. Nausea has subsided; she denies vomiting.    Objective  Temp:  [98.4 °F (36.9 °C)-98.8 °F (37.1 °C)] 98.7 °F (37.1 °C)  Pulse:  [] 84  Resp:  [17-18] 17  SpO2:  [98 %-100 %] 98 %  BP: (116-130)/(75-83) 124/83    Daily I/O:  In: 1550 [I.V.:1500; IV Piggyback:50]  Out: 1700 [Urine:1700]  Net: -150        Physical Exam  Gen: NAD, AAOx3, resting comfortably  CV: extremities wwp, regular rate, palpable radials  Pulm: nonlabored, no increased wob, equal chest rise b/l   Abd: soft, non-distended, appropriately mildly sore on palpation  Wounds: laparotomy sites c/d/i; abdominal binder in place    Labs:      Recent Labs   Lab 07/06/23  0344   WBC 14.73*   HGB 11.5*   HCT 34.7*         CHLORIDE 106   CO2 23   BUN 9.0   CREATININE 1.04*   GLUCOSE 182*   CALCIUM 8.8           A/P:    Yuri Mijares is a 37 y.o. female with PMHx of morbid obesity (BMI 55.34 kg/m2) s/p laparoscopic vertical sleeve gastrectomy.   - Advance to a Bariatric Phase 1 diet  - Multimodal pain control  - PRN antiemetics  - Encourage OOB to chair, ambulation, and IS use.  - Cont. IV fluids  - Lovenox    Disposition: pending toleration of bariatric phase 1 diet.       Suzy Vázquez, MS3  [facility]    I have reviewed the notes, assessments, and/or procedures performed by medical student Suzy, I concur with her/his documentation of Yuri Mijares.    DANTE Torres Jr., MD  [facility] General Surgery, PGY 1

## 2023-07-06 NOTE — MEDICAL/APP STUDENT
[facility] General [facility] Progress Note    Yuri Mijares   [id number]     Subjective  37 year old female with PMHx of morbid obesity (BMI 55.34 kg/m2) s/p laparoscopic vertical sleeve gastrectomy.   POD#1: Pt slept well overnight, reports that her pain has mostly subsided, and is still on an NPO diet.  Pt is voiding and able to pass flatus, but has not yet had a bowel movement. Pt has been able to ambulate without excessive pain. Nausea has subsided; she denies vomiting.    Objective  Temp:  [97.3 °F (36.3 °C)-98.7 °F (37.1 °C)] 98.5 °F (36.9 °C)  Pulse:  [] 93  Resp:  [16-20] 18  SpO2:  [92 %-100 %] 98 %  BP: (104-132)/(69-84) 127/76    Daily I/O:  In: 1550 [I.V.:1500; IV Piggyback:50]  Out: 1700 [Urine:1700]  Net: -150        Physical Exam  Gen: NAD, AAOx3, resting comfortably  CV: extremities wwp, regular rate, palpable radials  Pulm: nonlabored, no increased wob, equal chest rise b/l   Abd: soft, non-distended, appropriately mildly sore on palpation  Wounds: laparotomy sites c/d/i; abdominal binder in place    Labs:      Recent Labs   Lab 07/06/23  0344   WBC 14.73*   HGB 11.5*   HCT 34.7*         CHLORIDE 106   CO2 23   BUN 9.0   CREATININE 1.04*   GLUCOSE 182*   CALCIUM 8.8         A/P:    Yuri Mijares is a 37 y.o. female with PMHx of morbid obesity (BMI 55.34 kg/m2) s/p laparoscopic vertical sleeve gastrectomy.   - Advance to a Bariatric Phase 1 diet  - Multimodal pain control  - PRN antiemetics  - Encourage OOB to chair, ambulation, and IS use.  - Cont. IV fluids  - Lovenox    Disposition: pending toleration of bariatric phase 1 diet.       Suzy Vázquez, MS3  [facility]    I have reviewed the notes, assessments, and/or procedures performed by medical student Suzy, I concur with her/his documentation of Yuri Mijares.    DANTE Torres Jr., MD  [facility], PGY 1

## 2023-07-10 ENCOUNTER — TELEPHONE (OUTPATIENT)
Dept: BARIATRICS | Facility: HOSPITAL | Age: 38
End: 2023-07-10
Payer: MEDICAID

## 2023-07-10 NOTE — TELEPHONE ENCOUNTER
Date call was completed: 07/10/2023  Date and type of Surgery: VSG 7/5/23    Are you drinking the recommended amount of water (73-100oz) a day? []  Yes        [x]  No  If not, how may ounces of water are you drinking? 30-40 fl oz/d    Are you drinking the recommended amount of protein a day?(recommendations base on individual goal) [x]  Yes        []  No  If not, how many grams of protein are you drinking? 30 g/d    Are you taking the recommended supplements that were discussed in pre-op class?  [x]  Yes   [] No  Multivitamin [x]  Yes        []  No  Calcium Supplement [x]  Yes        []  No  Iron [x]  Yes        []  No  Miralax [x]  Yes        []  No    Are you walking at least 20 minutes a day for exercise? [x]  Yes   [] No    Have you resumed your home medications that you were instructed to resume? [x]  Yes   [] No    Do you have a follow-up appt scheduled with your family doctor or doctor treating you for you co-morb conditions within the week? [x]  Yes   [] No    Are you taking the Protonix (at night) that was prescribed to you in the hospital? [x]  Yes   [] No    Are you showering daily with an antibacterial soap?  [x]  Yes   [] No    Have you had a bowel movement since surgery? []  Yes   [x] No    Advised pt to increase fluid to  fl oz/d and to add Colace to regimen. If pt doesn't have a BM by Tuesday at noon, to call office.

## 2023-07-18 ENCOUNTER — CLINICAL SUPPORT (OUTPATIENT)
Dept: BARIATRICS | Facility: HOSPITAL | Age: 38
End: 2023-07-18

## 2023-07-18 VITALS — HEIGHT: 60 IN | WEIGHT: 270 LBS | BODY MASS INDEX: 53.01 KG/M2

## 2023-07-18 NOTE — PROGRESS NOTES
POST OP BARIATRIC NUTRITION FOLLOW UP    Type of surgery: sleeve gastrectomy   Post-op visit:   [x] 2 weeks  [] 4 weeks:  [] 2 months:  [] 4 months:  [] 6 months:  [] 9 months:  [] 1 year:   [] Other:     Weight: 270#       Post op complications:   [] Yes [x] No  If yes: [] Nausea   [] Vomiting   [] Constipation    [] Diarrhea    [] Other:     Dietary tolerance:  [x] Yes [] No [] Comment:     Adequate fluid intake:  [] Yes [x] No Approx. daily fluid intake: 32 fl oz/d  Adequate protein intake:  [] Yes [x] No  Approx. daily protein intake: 40-45g/d    Vitamins/Minerals:   [x] MVI:    [] Biotin:  [x] Calcium:    [] Hair/Nails:  [] B 50 complex:   [] Bariatric Specific MVI:  [] B12:    [] Bariatric Specific Calcium:  [x] Iron:    [] Other:   [] Non-compliance:        Labs:   not ordered at this visit.  Comment:    Expected compliance:  [x]Good  []Fair  []Poor      Progress:   [x]Patient progressing well at this time with no complaints   [] Patient expressed complaint at this time: See additional notes       Bariatric Diet Education:   Verbalizes understanding Demonstrates  Needs further teaching Needs practice/ supervision Comments    Bariatric Clear [] [] [] []    Bariatric Full [] [] [] []    Bariatric Puree [x] [] [] []    Bariatric Soft [x] [] [] []    Bariatric Regular [] [] [] []    Bariatric Reintroduction of Starchy CHO [] [] [] []    Bariatric: Mindful Eating [] [] [] []    Importance of Protein and Vitamin Protocol [] [] [] []    Other:            Additional Notes:

## 2023-09-08 ENCOUNTER — TELEPHONE (OUTPATIENT)
Dept: SURGERY | Facility: CLINIC | Age: 38
End: 2023-09-08
Payer: MEDICAID

## 2023-09-13 ENCOUNTER — CLINICAL SUPPORT (OUTPATIENT)
Dept: BARIATRICS | Facility: HOSPITAL | Age: 38
End: 2023-09-13

## 2023-09-13 ENCOUNTER — OFFICE VISIT (OUTPATIENT)
Dept: SURGERY | Facility: CLINIC | Age: 38
End: 2023-09-13
Payer: MEDICAID

## 2023-09-13 VITALS — WEIGHT: 248.31 LBS | BODY MASS INDEX: 48.49 KG/M2

## 2023-09-13 VITALS
HEIGHT: 60 IN | BODY MASS INDEX: 48.65 KG/M2 | WEIGHT: 247.81 LBS | HEART RATE: 80 BPM | DIASTOLIC BLOOD PRESSURE: 77 MMHG | SYSTOLIC BLOOD PRESSURE: 107 MMHG

## 2023-09-13 DIAGNOSIS — E66.9 OBESITY: Primary | ICD-10-CM

## 2023-09-13 DIAGNOSIS — Z98.84 HX OF BARIATRIC SURGERY: Primary | ICD-10-CM

## 2023-09-13 DIAGNOSIS — Z91.89 ELECTROLYTE IMBALANCE RISK: Primary | ICD-10-CM

## 2023-09-13 DIAGNOSIS — Z13.21 ENCOUNTER FOR VITAMIN DEFICIENCY SCREENING: ICD-10-CM

## 2023-09-13 DIAGNOSIS — E11.9 TYPE 2 DIABETES MELLITUS WITHOUT COMPLICATION, UNSPECIFIED WHETHER LONG TERM INSULIN USE: ICD-10-CM

## 2023-09-13 DIAGNOSIS — Z13.0 SCREENING FOR IRON DEFICIENCY ANEMIA: ICD-10-CM

## 2023-09-13 PROCEDURE — 1159F MED LIST DOCD IN RCRD: CPT | Mod: CPTII,,,

## 2023-09-13 PROCEDURE — 3078F DIAST BP <80 MM HG: CPT | Mod: CPTII,,,

## 2023-09-13 PROCEDURE — 99024 POSTOP FOLLOW-UP VISIT: CPT | Mod: ,,,

## 2023-09-13 PROCEDURE — 3008F BODY MASS INDEX DOCD: CPT | Mod: CPTII,,,

## 2023-09-13 PROCEDURE — 1159F PR MEDICATION LIST DOCUMENTED IN MEDICAL RECORD: ICD-10-PCS | Mod: CPTII,,,

## 2023-09-13 PROCEDURE — 3078F PR MOST RECENT DIASTOLIC BLOOD PRESSURE < 80 MM HG: ICD-10-PCS | Mod: CPTII,,,

## 2023-09-13 PROCEDURE — 99024 PR POST-OP FOLLOW-UP VISIT: ICD-10-PCS | Mod: ,,,

## 2023-09-13 PROCEDURE — 3074F PR MOST RECENT SYSTOLIC BLOOD PRESSURE < 130 MM HG: ICD-10-PCS | Mod: CPTII,,,

## 2023-09-13 PROCEDURE — 3008F PR BODY MASS INDEX (BMI) DOCUMENTED: ICD-10-PCS | Mod: CPTII,,,

## 2023-09-13 PROCEDURE — 1160F PR REVIEW ALL MEDS BY PRESCRIBER/CLIN PHARMACIST DOCUMENTED: ICD-10-PCS | Mod: CPTII,,,

## 2023-09-13 PROCEDURE — 3074F SYST BP LT 130 MM HG: CPT | Mod: CPTII,,,

## 2023-09-13 PROCEDURE — 1160F RVW MEDS BY RX/DR IN RCRD: CPT | Mod: CPTII,,,

## 2023-09-13 RX ORDER — CALCIUM CARBONATE 200(500)MG
1 TABLET,CHEWABLE ORAL DAILY
COMMUNITY

## 2023-09-13 RX ORDER — MULTIVIT WITH IRON,MINERALS
TABLET,CHEWABLE ORAL
COMMUNITY

## 2023-09-13 RX ORDER — ONDANSETRON 4 MG/1
4 TABLET, ORALLY DISINTEGRATING ORAL EVERY 6 HOURS PRN
Qty: 20 TABLET | Refills: 0 | Status: SHIPPED | OUTPATIENT
Start: 2023-09-13

## 2023-09-13 NOTE — PROGRESS NOTES
A 2 month F/U was conducted with patient. Her weight today is 248.3 lbs. A body composition and measurements were conducted.  Patient was educated on results. She lost 35 lbs. And 18.5 inches. Patient reports that she is not exercising, but she will start walking.    PLAN:  - Patient will walk 30 minutes or more 3-5x/week and start strengthening exercises 2x/week.  - Patient will follow dietary advice from Jayshree Gooden RD.    It is my professional opinion that patient is in the preparation stage of behavior change.    RICO Cardona, CPT, CHC

## 2023-09-13 NOTE — PROGRESS NOTES
Progress Note  General Surgery    Patient Name: Yuri Mijares  YOB: 1985    Date: 09/13/2023                   SUBJECTIVE:     Chief Complaint/Reason for Admission:   Chief Complaint   Patient presents with    Post-op Evaluation     [facility] 7/5/2023        History of Present Illness:  Ms. Yuri Mijares is a 38 y.o. female 2 mo s/p LVSG.  Pt without any complaints. Not exercising.  Good satiety.  Taking vitamins as directed.    Review of Systems:  12 point ROS negative except as stated in HPI    The patient's problem list, medication list, history and allergies were reviewed and updated as appropriate.    OBJECTIVE:   Visit Vitals  /77   Pulse 80   Ht 5' (1.524 m)   Wt 112.4 kg (247 lb 12.8 oz)   BMI 48.40 kg/m²        BMI Readings from Last 3 Encounters:   09/13/23 48.49 kg/m²   09/13/23 48.40 kg/m²   07/18/23 52.73 kg/m²        Physical Exam:  General:  Well developed, well nourished, no acute distress  HEENT:  Normocephalic, atraumatic, PERRL, EOMI, clear sclera, ears normal, neck supple, throat clear without erythema or exudates  CVS:  RRR, S1 and S2 normal, no murmurs, rubs, gallops  Resp:  Lungs clear to auscultation, no wheezes, rales, rhonchi, cough  GI:  Abdomen soft, non-tender, non-distended, normoactive bowel sounds, no masses  :  Deferred  MSK:  No muscle atrophy, cyanosis, peripheral edema, full range of motion  Skin:  No rashes, ulcers, erythema  Neuro:  CNII-XII grossly intact  Psych:  Alert and oriented to person, place, and time      Review / Management:     LABS:  not available    VISIT DIAGNOSES:       ICD-10-CM ICD-9-CM   1. Electrolyte imbalance risk  Z91.89 V49.89   2. Type 2 diabetes mellitus without complication, unspecified whether long term insulin use  E11.9 250.00   3. Screening for iron deficiency anemia  Z13.0 V78.0   4. Encounter for vitamin deficiency screening  Z13.21 V77.99        ASSESSMENT/PLAN:     Starting Weight 257   Preop Weight 284    2 Week Weight 264   2 month Weight 248   6 month Weight    1 year Weight        -  Pt doing well  -  Discussed exercise goals at this time  -  F/U with Bariatric team   -  Encouraged support groups    RTC  4 mo with labs

## 2023-09-13 NOTE — PROGRESS NOTES
POST OP BARIATRIC NUTRITION FOLLOW UP    Type of surgery: sleeve gastrectomy   Post-op visit:   [] 2 weeks  [] 4 weeks:  [x] 2 months:  [] 4 months:  [] 6 months:  [] 9 months:  [] 1 year:   [] Other:     Weight:  248#       Post op complications:   [x] Yes [] No  If yes: [] Nausea   [] Vomiting   [x] Constipation    [] Diarrhea    [] Other:  some times she is constipated     Dietary tolerance:  [] Yes [] No [] Comment:     Adequate fluid intake:  [] Yes [x] No Approx. daily fluid intake:  30oz, she is trying different things to increase it   Adequate protein intake:  [] Yes [x] No  Approx. daily protein intake: 30g     Vitamins/Minerals:   [x] MVI:    [] Biotin:  [x] Calcium:    [] Hair/Nails:  [] B 50 complex:   [] Bariatric Specific MVI:  [] B12:    [] Bariatric Specific Calcium:  [x] Iron:    [] Other:   [] Non-compliance:      Pt will change vitamins to 2m regime   Labs:    not available   Comment:    Expected compliance:  [x]Good  []Fair  []Poor      Progress:   [x]Patient progressing well at this time with no complaints   [] Patient expressed complaint at this time: See additional notes       Bariatric Diet Education:   Verbalizes understanding Demonstrates  Needs further teaching Needs practice/ supervision Comments    Bariatric Clear [] [] [] []    Bariatric Full [] [] [] []    Bariatric Puree [] [] [] []    Bariatric Soft [] [] [] []    Bariatric Regular [] [] [] []    Bariatric Reintroduction of Starchy CHO [] [] [] []    Bariatric: Mindful Eating [] [] [] []    Importance of Protein and Vitamin Protocol [] [] [] []    Other:            Additional Notes:    Pt is doing well with no complications but she is struggling to remember to drink water, we dicussed ways to help her increase it by using  reminders at work. She is also eating starches and reminded that we do not want starches for 6 months. She will replace oatmela with protein shake     Oatmeal and fruit   2oz chicken   2oz meat

## 2023-12-13 ENCOUNTER — TELEPHONE (OUTPATIENT)
Dept: SURGERY | Facility: CLINIC | Age: 38
End: 2023-12-13
Payer: MEDICAID

## 2023-12-13 DIAGNOSIS — E11.9 TYPE 2 DIABETES MELLITUS WITHOUT COMPLICATION, UNSPECIFIED WHETHER LONG TERM INSULIN USE: ICD-10-CM

## 2023-12-13 DIAGNOSIS — Z91.89 ELECTROLYTE IMBALANCE RISK: ICD-10-CM

## 2023-12-13 DIAGNOSIS — Z13.0 SCREENING, ANEMIA, DEFICIENCY, IRON: ICD-10-CM

## 2023-12-13 DIAGNOSIS — Z13.21 ENCOUNTER FOR VITAMIN DEFICIENCY SCREENING: Primary | ICD-10-CM

## 2024-01-03 ENCOUNTER — TELEPHONE (OUTPATIENT)
Dept: SURGERY | Facility: CLINIC | Age: 39
End: 2024-01-03

## 2024-02-06 ENCOUNTER — OFFICE VISIT (OUTPATIENT)
Dept: OPTOMETRY | Facility: CLINIC | Age: 39
End: 2024-02-06
Payer: MEDICAID

## 2024-02-06 DIAGNOSIS — H18.623 KERATOCONUS, UNSTABLE, BILATERAL: Primary | ICD-10-CM

## 2024-02-06 DIAGNOSIS — H40.013 OPEN ANGLE WITH BORDERLINE FINDINGS OF BOTH EYES: ICD-10-CM

## 2024-02-06 DIAGNOSIS — Z94.7 S/P PKP (PENETRATING KERATOPLASTY): ICD-10-CM

## 2024-02-06 PROCEDURE — 99212 OFFICE O/P EST SF 10 MIN: CPT | Mod: PBBFAC | Performed by: OPTOMETRIST

## 2024-02-06 PROCEDURE — 1159F MED LIST DOCD IN RCRD: CPT | Mod: CPTII,,, | Performed by: OPTOMETRIST

## 2024-02-06 PROCEDURE — 99999 PR PBB SHADOW E&M-EST. PATIENT-LVL II: CPT | Mod: PBBFAC,,, | Performed by: OPTOMETRIST

## 2024-02-06 PROCEDURE — 92004 COMPRE OPH EXAM NEW PT 1/>: CPT | Mod: S$PBB,,, | Performed by: OPTOMETRIST

## 2024-02-06 PROCEDURE — 1160F RVW MEDS BY RX/DR IN RCRD: CPT | Mod: CPTII,,, | Performed by: OPTOMETRIST

## 2024-02-06 NOTE — PROGRESS NOTES
ANTHONY    EVELINA: w/ Dr. Beckett 2022     Chief complaint (CC): Pt sts she believes vision has been doing much   better. Possibly some straining at night for driving and seeing in dim   light.   Current glasses are about 2 years old     Glasses? Yes 1yo   Contacts? NONE  H/o eye surgery, injections or laser:    S/p corneal transplant OD 09/22/2022 Dr. Beckett  H/o eye injury: NONE  Known eye conditions? KCN   Family h/o eye conditions? Unknown   Eye gtts? NONE    (-) Flashes (-)  Floaters (-) Mucous   (+)  Tearing (+) Itching (+) Burning   (+) Headaches (-) Eye Pain/discomfort (+) Irritation   (-)  Redness (-) Double vision (-) Blurry vision    Diabetic? NONE   A1c? Lab Results       Component                Value               Date                       HGBA1C                   5.9 (H)             05/19/2020                  Last edited by Toño Luz, OD on 2/6/2024 12:57 PM.            Assessment /Plan     For exam results, see Encounter Report.        Keratoconus, unstable, bilateral  S/P PKP (penetrating keratoplasty) OD   - Pt educated on condition. Vision OD significantly improved since last visit.    - BCVA OD 20/20-2. New spectacle Rx given today.    - Loose suture causing discomfort OD   - Pt expresses interest in PKP OS.    - RTC next available with Dr. Beckett    Open angle with borderline findings of both eyes   - Secondary to ONH appearance OU  - IOP normotensive OU (12/15)    - (-) family hx   - RTC 3 months for baseline VF 24-2, OCT RNFL, Pachy and IOP check.

## 2024-03-08 ENCOUNTER — OFFICE VISIT (OUTPATIENT)
Dept: OPHTHALMOLOGY | Facility: CLINIC | Age: 39
End: 2024-03-08
Payer: MEDICAID

## 2024-03-08 DIAGNOSIS — Z94.7 STATUS POST CORNEAL TRANSPLANT: ICD-10-CM

## 2024-03-08 DIAGNOSIS — T86.8409 REJECTION OF CORNEAL GRAFT: ICD-10-CM

## 2024-03-08 DIAGNOSIS — H18.623 KERATOCONUS, UNSTABLE, BILATERAL: Primary | ICD-10-CM

## 2024-03-08 PROCEDURE — 1160F RVW MEDS BY RX/DR IN RCRD: CPT | Mod: CPTII,,, | Performed by: OPHTHALMOLOGY

## 2024-03-08 PROCEDURE — 99213 OFFICE O/P EST LOW 20 MIN: CPT | Mod: S$PBB,,, | Performed by: OPHTHALMOLOGY

## 2024-03-08 PROCEDURE — 1159F MED LIST DOCD IN RCRD: CPT | Mod: CPTII,,, | Performed by: OPHTHALMOLOGY

## 2024-03-08 PROCEDURE — 99999 PR PBB SHADOW E&M-EST. PATIENT-LVL III: CPT | Mod: PBBFAC,,, | Performed by: OPHTHALMOLOGY

## 2024-03-08 PROCEDURE — 99213 OFFICE O/P EST LOW 20 MIN: CPT | Mod: PBBFAC | Performed by: OPHTHALMOLOGY

## 2024-03-08 RX ORDER — MOXIFLOXACIN 5 MG/ML
1 SOLUTION/ DROPS OPHTHALMIC 4 TIMES DAILY
Qty: 3 ML | Refills: 0 | Status: SHIPPED | OUTPATIENT
Start: 2024-03-08 | End: 2024-03-15

## 2024-03-08 NOTE — PROGRESS NOTES
HPI    09/22/2022 Penetrating keratoplasty, right eye    Patient is here today due to experiencing discomfort in OD. Last seen on   02/06/2024 with Dr. Luz. States there is a loose suture in her eye. No   pain, but there is some tearing.   Last edited by Allison Garduno on 3/8/2024  3:01 PM.            Assessment /Plan     For exam results, see Encounter Report.    Keratoconus, unstable, bilateral    Status post corneal transplant    Rejection of corneal graft      PKP OD 9/22 with pigment noted 10/22 then lost to follow up for 1.5 yrs  Now with loose suture and 3+ endopigment, (old KP). Off Pred for 1 year+    OS s.p CXL, stable unable to tolerate CTL. No surgical indication.    Loose running removed.

## 2024-03-27 ENCOUNTER — TELEPHONE (OUTPATIENT)
Dept: SURGERY | Facility: CLINIC | Age: 39
End: 2024-03-27
Payer: MEDICAID

## 2024-04-03 ENCOUNTER — TELEPHONE (OUTPATIENT)
Dept: SURGERY | Facility: CLINIC | Age: 39
End: 2024-04-03

## 2024-04-03 ENCOUNTER — CLINICAL SUPPORT (OUTPATIENT)
Dept: BARIATRICS | Facility: HOSPITAL | Age: 39
End: 2024-04-03
Payer: MEDICAID

## 2024-04-03 DIAGNOSIS — Z98.84 HX OF BARIATRIC SURGERY: Primary | ICD-10-CM

## 2024-04-04 NOTE — PROGRESS NOTES
Pt attended 9m group follow up class. Pt reports no complaints at this time. Pt advised to call with questions/concerns if needed prior to 1 year individual follow up.Pt attended 9m group follow up class. Pt reports no complaints at this time. Pt advised to call with questions/concerns if needed prior to 1 year individual follow up.

## 2024-06-11 ENCOUNTER — TELEPHONE (OUTPATIENT)
Dept: SURGERY | Facility: CLINIC | Age: 39
End: 2024-06-11
Payer: MEDICAID

## 2024-07-01 ENCOUNTER — TELEPHONE (OUTPATIENT)
Dept: SURGERY | Facility: CLINIC | Age: 39
End: 2024-07-01
Payer: MEDICAID

## 2024-07-08 ENCOUNTER — CLINICAL SUPPORT (OUTPATIENT)
Dept: BARIATRICS | Facility: HOSPITAL | Age: 39
End: 2024-07-08

## 2024-07-08 VITALS — BODY MASS INDEX: 40.27 KG/M2 | WEIGHT: 206.19 LBS

## 2024-07-08 DIAGNOSIS — E66.9 OBESITY: Primary | ICD-10-CM

## 2024-07-08 DIAGNOSIS — Z98.84 HX OF BARIATRIC SURGERY: Primary | ICD-10-CM

## 2024-07-08 NOTE — PROGRESS NOTES
POST OP BARIATRIC NUTRITION FOLLOW UP    Type of surgery: sleeve gastrectomy  Post-op visit:   [] 2 weeks  [] 4 weeks:  [] 2 months:  [] 4 months:  [] 6 months:  [] 9 months:  [x] 1 year:   [] Other:     Height:   Ht Readings from Last 1 Encounters:   09/13/23 5' (1.524 m)      Weight:   Wt Readings from Last 3 Encounters:   09/13/23 1019 112.6 kg (248 lb 4.8 oz)   09/13/23 0920 112.4 kg (247 lb 12.8 oz)   07/18/23 0942 122.5 kg (270 lb)      BMI:   BMI Readings from Last 1 Encounters:   09/13/23 48.49 kg/m²       Post op complications:   [] Yes [x] No  If yes: [] Nausea   [] Vomiting   [] Constipation    [] Diarrhea    [] Other:     Dietary tolerance:  [x] Yes [] No [] Comment:     Adequate fluid intake:  [x] Yes [] No Approx. daily fluid intake: 60 oz  Adequate protein intake:  [] Yes [x] No  Approx. daily protein intake: 20 g (goal 60g)    Vitamins/Minerals:   [] MVI:    [] Biotin:  [] Calcium:    [] Hair/Nails:  [] B 50 complex:   [] Bariatric Specific MVI:  [] B12:    [] Bariatric Specific Calcium:  [] Iron:    [x] Other: Vit D  [x] Non-compliance:  MVI, Iron, Oscar, B12, B50 complex    Labs:   not done  Comment: non-compliant with vitamin intake- rec'd [employer] MVI    Expected compliance:  []Good  [x]Fair  []Poor    Progress:   [x]Patient progressing well at this time with no complaints   [] Patient expressed complaint at this time: See additional notes     Bariatric Diet Education:   Verbalizes understanding Demonstrates  Needs further teaching Needs practice/ supervision Comments    Bariatric Clear [] [] [] []    Bariatric Full [] [] [] []    Bariatric Puree [] [] [] []    Bariatric Soft [] [] [] []    Bariatric Regular [] [] [] []    Bariatric Reintroduction of Starchy CHO [] [] [] []    Bariatric: Mindful Eating [x] [] [] []    Importance of Protein and Vitamin Protocol [x] [] [] []    Other:            Additional Notes:   Pt has lost 82# overall and has no complaints of food intolerance or complications  since surgery. Reports no n/v/d. Pt is not compliant with appropriate vitamin regimen. Labs not uploaded into chart so unsure of levels. Rec'd to start taking [employer] MVI to improve vitamin compliance. Pt is also not hitting goal for rec'd protein intake and says she has been having more cravings. Rec'd she add a protein fruit smoothie in the morning with milk to help her hit her protein goal and also snack on things like cheese and nuts to help her curve her appetite. Also reminded pt on limiting starches to one serving daily.

## 2024-07-08 NOTE — PROGRESS NOTES
A 1 year visit was conducted with Yuri in the office.  Current weight is 207.6 lbs. A body composition was conducted and patient lost 75 lbs.(80 lbs. From highest weight) and 38.5 inches since preop body composition was conducted.  Patient was educated on her results. She would like to maintain her weight. She is not exercising. She was educated on how to maintain her weight and exercise was stressed..  No issues or concerns at this time.    It is my professional opinion that patient is in the maintenance stage of behavior change.      Myrna Narayan, RICO, CPT, CHC

## 2024-07-10 ENCOUNTER — LAB VISIT (OUTPATIENT)
Dept: LAB | Facility: HOSPITAL | Age: 39
End: 2024-07-10
Attending: SURGERY
Payer: MEDICAID

## 2024-07-10 ENCOUNTER — OFFICE VISIT (OUTPATIENT)
Dept: SURGERY | Facility: CLINIC | Age: 39
End: 2024-07-10
Payer: MEDICAID

## 2024-07-10 VITALS
WEIGHT: 205 LBS | HEART RATE: 87 BPM | HEIGHT: 60 IN | SYSTOLIC BLOOD PRESSURE: 109 MMHG | DIASTOLIC BLOOD PRESSURE: 78 MMHG | BODY MASS INDEX: 40.25 KG/M2

## 2024-07-10 DIAGNOSIS — Z13.21 ENCOUNTER FOR VITAMIN DEFICIENCY SCREENING: Primary | ICD-10-CM

## 2024-07-10 DIAGNOSIS — Z13.21 ENCOUNTER FOR VITAMIN DEFICIENCY SCREENING: ICD-10-CM

## 2024-07-10 DIAGNOSIS — E11.9 TYPE 2 DIABETES MELLITUS WITHOUT COMPLICATION, UNSPECIFIED WHETHER LONG TERM INSULIN USE: ICD-10-CM

## 2024-07-10 DIAGNOSIS — Z13.0 SCREENING, ANEMIA, DEFICIENCY, IRON: ICD-10-CM

## 2024-07-10 DIAGNOSIS — Z91.89 ELECTROLYTE IMBALANCE RISK: ICD-10-CM

## 2024-07-10 LAB
ALBUMIN SERPL-MCNC: 3.2 G/DL (ref 3.5–5)
ALBUMIN/GLOB SERPL: 1 RATIO (ref 1.1–2)
ALP SERPL-CCNC: 74 UNIT/L (ref 40–150)
ALT SERPL-CCNC: 9 UNIT/L (ref 0–55)
ANION GAP SERPL CALC-SCNC: 6 MEQ/L
AST SERPL-CCNC: 11 UNIT/L (ref 5–34)
BASOPHILS # BLD AUTO: 0.04 X10(3)/MCL
BASOPHILS NFR BLD AUTO: 0.7 %
BILIRUB SERPL-MCNC: 0.4 MG/DL
BUN SERPL-MCNC: 6.3 MG/DL (ref 7–18.7)
CALCIUM SERPL-MCNC: 8.9 MG/DL (ref 8.4–10.2)
CHLORIDE SERPL-SCNC: 107 MMOL/L (ref 98–107)
CO2 SERPL-SCNC: 27 MMOL/L (ref 22–29)
CREAT SERPL-MCNC: 0.76 MG/DL (ref 0.55–1.02)
CREAT/UREA NIT SERPL: 8
EOSINOPHIL # BLD AUTO: 0.14 X10(3)/MCL (ref 0–0.9)
EOSINOPHIL NFR BLD AUTO: 2.6 %
ERYTHROCYTE [DISTWIDTH] IN BLOOD BY AUTOMATED COUNT: 13.1 % (ref 11.5–17)
EST. AVERAGE GLUCOSE BLD GHB EST-MCNC: 96.8 MG/DL
GFR SERPLBLD CREATININE-BSD FMLA CKD-EPI: >60 ML/MIN/1.73/M2
GLOBULIN SER-MCNC: 3.1 GM/DL (ref 2.4–3.5)
GLUCOSE SERPL-MCNC: 88 MG/DL (ref 74–100)
HBA1C MFR BLD: 5 %
HCT VFR BLD AUTO: 35.7 % (ref 37–47)
HGB BLD-MCNC: 11.8 G/DL (ref 12–16)
IMM GRANULOCYTES # BLD AUTO: 0.01 X10(3)/MCL (ref 0–0.04)
IMM GRANULOCYTES NFR BLD AUTO: 0.2 %
IRON SATN MFR SERPL: 34 % (ref 20–50)
IRON SERPL-MCNC: 82 UG/DL (ref 50–170)
LYMPHOCYTES # BLD AUTO: 2.33 X10(3)/MCL (ref 0.6–4.6)
LYMPHOCYTES NFR BLD AUTO: 43.6 %
MCH RBC QN AUTO: 30.3 PG (ref 27–31)
MCHC RBC AUTO-ENTMCNC: 33.1 G/DL (ref 33–36)
MCV RBC AUTO: 91.5 FL (ref 80–94)
MONOCYTES # BLD AUTO: 0.53 X10(3)/MCL (ref 0.1–1.3)
MONOCYTES NFR BLD AUTO: 9.9 %
NEUTROPHILS # BLD AUTO: 2.29 X10(3)/MCL (ref 2.1–9.2)
NEUTROPHILS NFR BLD AUTO: 43 %
NRBC BLD AUTO-RTO: 0 %
PLATELET # BLD AUTO: 265 X10(3)/MCL (ref 130–400)
PMV BLD AUTO: 10.4 FL (ref 7.4–10.4)
POTASSIUM SERPL-SCNC: 4 MMOL/L (ref 3.5–5.1)
PROT SERPL-MCNC: 6.3 GM/DL (ref 6.4–8.3)
RBC # BLD AUTO: 3.9 X10(6)/MCL (ref 4.2–5.4)
SODIUM SERPL-SCNC: 140 MMOL/L (ref 136–145)
TIBC SERPL-MCNC: 159 UG/DL (ref 70–310)
TIBC SERPL-MCNC: 241 UG/DL (ref 250–450)
TRANSFERRIN SERPL-MCNC: 217 MG/DL (ref 180–382)
VIT B12 SERPL-MCNC: 398 PG/ML (ref 213–816)
WBC # BLD AUTO: 5.34 X10(3)/MCL (ref 4.5–11.5)

## 2024-07-10 PROCEDURE — 36415 COLL VENOUS BLD VENIPUNCTURE: CPT

## 2024-07-10 PROCEDURE — 3074F SYST BP LT 130 MM HG: CPT | Mod: CPTII,,,

## 2024-07-10 PROCEDURE — 1159F MED LIST DOCD IN RCRD: CPT | Mod: CPTII,,,

## 2024-07-10 PROCEDURE — 99214 OFFICE O/P EST MOD 30 MIN: CPT | Mod: ,,,

## 2024-07-10 PROCEDURE — 3078F DIAST BP <80 MM HG: CPT | Mod: CPTII,,,

## 2024-07-10 PROCEDURE — 3008F BODY MASS INDEX DOCD: CPT | Mod: CPTII,,,

## 2024-07-10 NOTE — PROGRESS NOTES
Progress Note  General Surgery    Patient Name: Yuri Mijares  YOB: 1985    Date: 07/10/2024                   SUBJECTIVE:     Chief Complaint/Reason for Admission:   Chief Complaint   Patient presents with    Follow-up     [facility] 7/5/23        History of Present Illness:  Ms. Yuri Mijares is a 38 y.o. female 1 year s/p LVSG.  Pt without any complaints. Minimal exercising - walking 1-2 d/wk.  Good satiety.  Taking vitamins as directed most days. She is happy with weight loss.    Review of Systems:  12 point ROS negative except as stated in HPI    The patient's problem list, medication list, history and allergies were reviewed and updated as appropriate.    OBJECTIVE:   Visit Vitals  /78   Pulse 87   Ht 5' (1.524 m)   Wt 93 kg (205 lb)   BMI 40.04 kg/m²        BMI Readings from Last 3 Encounters:   07/10/24 40.04 kg/m²   07/08/24 40.27 kg/m²   09/13/23 48.49 kg/m²        Physical Exam:  General:  Well developed, well nourished, no acute distress  HEENT:  Normocephalic, atraumatic, PERRL, EOMI, clear sclera, ears normal, neck supple, throat clear without erythema or exudates  CVS:  RRR, S1 and S2 normal, no murmurs, rubs, gallops  Resp:  Lungs clear to auscultation, no wheezes, rales, rhonchi, cough  GI:  Abdomen soft, non-tender, non-distended, normoactive bowel sounds, no masses  :  Deferred  MSK:  No muscle atrophy, cyanosis, peripheral edema, full range of motion  Skin:  No rashes, ulcers, erythema  Neuro:  CNII-XII grossly intact  Psych:  Alert and oriented to person, place, and time      Review / Management:     LABS:  not available - will have drawn after appointment    VISIT DIAGNOSES:       ICD-10-CM ICD-9-CM   1. Encounter for vitamin deficiency screening  Z13.21 V77.99          ASSESSMENT/PLAN:     Starting Weight 257   Preop Weight 284   2 Week Weight 264   2 month Weight 248   6 month Weight ----   1 year Weight 205       -  Pt doing well  -  Discussed exercise  goals at this time  -  F/U with Bariatric team   -  Encouraged support groups  -  will review labs when resulted    RTC 12 mo with labs

## 2024-07-15 LAB — VIT B1 BLD-SCNC: 84 NMOL/L (ref 70–180)

## 2025-01-09 ENCOUNTER — TELEPHONE (OUTPATIENT)
Dept: OPHTHALMOLOGY | Facility: CLINIC | Age: 40
End: 2025-01-09
Payer: MEDICAID

## 2025-01-09 NOTE — TELEPHONE ENCOUNTER
----- Message from Lizbeth sent at 1/9/2025  3:34 PM CST -----  Regarding: Consult/Advisory  Contact: Yana @  [phone] phone  Consult/Advisory     Name Of Caller: Yana ([facility])      Contact Preference: [phone] phone [phone]     Nature of call: calling to get notes fax from las visit

## 2025-04-24 ENCOUNTER — TELEPHONE (OUTPATIENT)
Dept: SURGERY | Facility: CLINIC | Age: 40
End: 2025-04-24
Payer: COMMERCIAL

## 2025-04-24 NOTE — LETTER
Bariatric Lab Order    Please Fax Results to [phone]  Date Ordered:  2025    Patient Name:  Yuri Mijares    :  1985    [x] CBC with Differential (Diagnosis: E61.1, R53.83)  [x] CMP (Diagnosis: R53.83)  [x] Serum Iron (Diagnosis: E61.1, T45.2X6A)  [x] TIBC (Diagnosis: E61.1, T45.2X6A)  [x] Vitamin B-12 Level (Diagnosis: D51.1, T45.2X6A)  [x] Vitamin B-1 Thiamin Whole Blood Level (Diagnosis: D51, T45.2X6A)    Additional Labs:  [] Hemoglobin A1C (Diagnosis: E11.9, R63.5, E66.01)  [] Vitamin D-25 (Diagnosis: Z78.0)  [] Thyroid Panel (Diagnosis: R63.5  [] Lipid Panel (Diagnosis: E78.00)      Vineet Corrigan M.D.      **Blood Work can be done at any facility of your choosing or with your primary care physician**  Please have all blood work completed at minimum 2 week prior to next visit.    Upcoming Appointment:     [facility]  [address]   [address]  [place]  [address]  o [phone]   f [phone]

## 2025-04-24 NOTE — TELEPHONE ENCOUNTER
Pt called with concerns of recent weight gain, she thinks due to medications she is currently on  She calling to get suggestions on what she could do- since her yearly is coming up in July I scheduled her an appt and sending BW order to [facility] per pt request

## 2025-05-01 NOTE — PROGRESS NOTES
Progress Note  General Surgery    Patient Name: Yuri Mijares  YOB: 1985    Date: 06/11/2025                   SUBJECTIVE:     Chief Complaint/Reason for Admission:   Chief Complaint   Patient presents with    Follow-up     VSG 7/5/23        History of Present Illness:  Ms. Yuri Mijares is a 39 y.o. female 2 years s/p LVSG.  Pt without any complaints. Minimal exercising.  Good satiety.  Taking vitamins as directed most days.    Review of Systems:  12 point ROS negative except as stated in HPI    The patient's problem list, medication list, history and allergies were reviewed and updated as appropriate.    OBJECTIVE:   Visit Vitals  /85   Pulse 84   Ht 5' (1.524 m)   Wt 106.6 kg (235 lb)   BMI 45.90 kg/m²          BMI Readings from Last 3 Encounters:   05/05/25 45.90 kg/m²   07/10/24 40.04 kg/m²   07/08/24 40.27 kg/m²        Physical Exam:  General:  Well developed, well nourished, no acute distress  HEENT:  Normocephalic, atraumatic, PERRL, EOMI, clear sclera, ears normal, neck supple, throat clear without erythema or exudates  CVS:  RRR, S1 and S2 normal, no murmurs, rubs, gallops  Resp:  Lungs clear to auscultation, no wheezes, rales, rhonchi, cough  GI:  Abdomen soft, non-tender, non-distended, normoactive bowel sounds, no masses  :  Deferred  MSK:  No muscle atrophy, cyanosis, peripheral edema, full range of motion  Skin:  No rashes, ulcers, erythema  Neuro:  CNII-XII grossly intact  Psych:  Alert and oriented to person, place, and time      Review / Management:     LABS:  not available - will have drawn after appointment    VISIT DIAGNOSES:       ICD-10-CM ICD-9-CM   1. At risk for electrolyte imbalance  Z91.89 V49.89   2. Encounter for vitamin deficiency screening  Z13.21 V77.99   3. Screening for iron deficiency anemia  Z13.0 V78.0          ASSESSMENT/PLAN:     Starting Weight 257   Preop Weight 284   2 Week Weight 264   2 month Weight 248   6 month Weight ----    1 Year Weight 205   2 Year Weight 235       -  Pt doing well  -  weight gain  -  Discussed exercise goals at this time  -  F/U with Bariatric team   -  Encouraged support groups  -  will review labs when resulted    RTC 12 mo with labs

## 2025-05-05 ENCOUNTER — OFFICE VISIT (OUTPATIENT)
Dept: SURGERY | Facility: CLINIC | Age: 40
End: 2025-05-05
Payer: COMMERCIAL

## 2025-05-05 VITALS
DIASTOLIC BLOOD PRESSURE: 85 MMHG | BODY MASS INDEX: 46.13 KG/M2 | HEIGHT: 60 IN | WEIGHT: 235 LBS | HEART RATE: 84 BPM | SYSTOLIC BLOOD PRESSURE: 129 MMHG

## 2025-05-05 DIAGNOSIS — Z13.0 SCREENING FOR IRON DEFICIENCY ANEMIA: ICD-10-CM

## 2025-05-05 DIAGNOSIS — Z13.21 ENCOUNTER FOR VITAMIN DEFICIENCY SCREENING: ICD-10-CM

## 2025-05-05 DIAGNOSIS — Z91.89 AT RISK FOR ELECTROLYTE IMBALANCE: Primary | ICD-10-CM

## 2025-05-05 PROCEDURE — 1159F MED LIST DOCD IN RCRD: CPT | Mod: CPTII,,,

## 2025-05-05 PROCEDURE — 3079F DIAST BP 80-89 MM HG: CPT | Mod: CPTII,,,

## 2025-05-05 PROCEDURE — 3008F BODY MASS INDEX DOCD: CPT | Mod: CPTII,,,

## 2025-05-05 PROCEDURE — 99214 OFFICE O/P EST MOD 30 MIN: CPT | Mod: ,,,

## 2025-05-05 PROCEDURE — 3074F SYST BP LT 130 MM HG: CPT | Mod: CPTII,,,

## 2025-06-12 ENCOUNTER — TELEPHONE (OUTPATIENT)
Dept: SURGERY | Facility: CLINIC | Age: 40
End: 2025-06-12
Payer: COMMERCIAL

## (undated) DEVICE — PAD EYE OVAL CNTOUR 1.62X2.62

## (undated) DEVICE — SOL PVP-I SCRUB 7.5% 4OZ

## (undated) DEVICE — SKIN MARKER DEVON 160

## (undated) DEVICE — SOL IRRI STRL WATER 1000ML

## (undated) DEVICE — NDL 27G X 1 1/4

## (undated) DEVICE — STAPLER ECHELON FLEX 60MM 44CM

## (undated) DEVICE — APPLICATOR VISTASEAL RIG 35CM

## (undated) DEVICE — HANDLE DEVON RIGID OR LIGHT

## (undated) DEVICE — SYR 10CC LUER LOCK

## (undated) DEVICE — SHEARS HS LONG 5MM CURVED

## (undated) DEVICE — GOWN SMARTSLEEVE AAMI LVL4 LG

## (undated) DEVICE — GLOVE PROTEXIS LTX MICRO 6.5

## (undated) DEVICE — SOL NORMAL USPCA 0.9%

## (undated) DEVICE — PAD PREP CUFFED NS 24X48IN

## (undated) DEVICE — ADHESIVE MASTISOL VIAL 48/BX

## (undated) DEVICE — GLOVE PROTEXIS HYDROGEL SZ6.5

## (undated) DEVICE — TROCAR ENDOPATH XCEL 5X100MM

## (undated) DEVICE — ELECTRODE PATIENT RETURN DISP

## (undated) DEVICE — KIT SURGICAL TURNOVER

## (undated) DEVICE — TROCAR ENDOPATH XCEL 15MM 10CM

## (undated) DEVICE — GLOVE PROTEXIS BLUE LATEX 7

## (undated) DEVICE — PUNCH BARRON VACUUM 8.50M

## (undated) DEVICE — SOL BETADINE 5%

## (undated) DEVICE — KNIFE OPHTHALMIC 15 DEG

## (undated) DEVICE — SUT VICRYL PLUS 4-0 FS-2 27IN

## (undated) DEVICE — STRIP MEDI WND CLSR 1/2X4IN

## (undated) DEVICE — GLOVE BIOGEL SKINSENSE PI 7.5

## (undated) DEVICE — DRAPE UTILITY STRL 15X26IN

## (undated) DEVICE — RELOAD ECHELON FLEX GRN 60MM

## (undated) DEVICE — TREPHINE RADIAL VACUUM 8.25

## (undated) DEVICE — GLOVE PROTEXIS HYDROGEL SZ7.5

## (undated) DEVICE — SUT NYLON HGL5 DA 10/0 12IN

## (undated) DEVICE — APPLICATOR CHLORAPREP ORN 26ML

## (undated) DEVICE — Device

## (undated) DEVICE — GOWN POLY REINF BRTH SLV XL

## (undated) DEVICE — SCISSOR CURVED ENDOPATH 5MM

## (undated) DEVICE — GLOVE PROTEXIS LTX MICRO  7.5

## (undated) DEVICE — CANNULA ENDOPATH XCEL 5X100MM

## (undated) DEVICE — SUT VICRYL+ 27 UR-6 VIOL

## (undated) DEVICE — BINDER ABD 12IN LG 63-74IN

## (undated) DEVICE — POSITIONER IV ARMBOARD FOAM

## (undated) DEVICE — CANNULA IRR ANTERIOR 27GX4MM

## (undated) DEVICE — COVER PROXIMA MAYO STAND

## (undated) DEVICE — MANIFOLD 4 PORT

## (undated) DEVICE — RELOAD ECHELON FLEX BLU 60MM

## (undated) DEVICE — NDL HYPO A BEVEL 30X1/2

## (undated) DEVICE — TROCAR ENDOPATH XCEL 11MM 10CM

## (undated) DEVICE — IRRIGATOR HYDRO-SURG PLUS 5MM

## (undated) DEVICE — NDL SAFETY 21G X 1 IN ECLIPSE

## (undated) DEVICE — SEALANT VISTASEAL FIBRIN 10ML

## (undated) DEVICE — SHIELD EYE PLASTIC 3100G

## (undated) DEVICE — CLIP ENDO LIGATION LARGE CLIPS

## (undated) DEVICE — GLOVE PROTEXIS BLUE LATEX 7.5

## (undated) DEVICE — TAPE SURG MICROPORE 2 SGL USE

## (undated) DEVICE — NDL GRANEE OPEN LOOP GRASPER

## (undated) DEVICE — TOWEL OR DISP STRL BLUE 4/PK

## (undated) DEVICE — KIT LAPAROSCOPY UNIVERSITY

## (undated) DEVICE — POSITIONER HEEL FOAM CONVOLTD

## (undated) DEVICE — DRESSING EYE OVAL LF

## (undated) DEVICE — MATTRESS HOVERMAT TRNSF 34X78